# Patient Record
Sex: FEMALE | Race: WHITE | Employment: OTHER | URBAN - METROPOLITAN AREA
[De-identification: names, ages, dates, MRNs, and addresses within clinical notes are randomized per-mention and may not be internally consistent; named-entity substitution may affect disease eponyms.]

---

## 2017-03-15 ENCOUNTER — HOSPITAL ENCOUNTER (OUTPATIENT)
Dept: MAMMOGRAPHY | Age: 62
Discharge: HOME | End: 2017-03-15
Attending: FAMILY MEDICINE
Payer: MEDICARE

## 2017-03-15 ENCOUNTER — APPOINTMENT (OUTPATIENT)
Dept: MAMMOGRAPHY | Age: 62
End: 2017-03-15
Attending: FAMILY MEDICINE
Payer: MEDICARE

## 2017-03-15 DIAGNOSIS — M81.0 OSTEOPOROSIS: ICD-10-CM

## 2017-03-15 DIAGNOSIS — Z12.39 SCREENING FOR MALIGNANT NEOPLASM OF BREAST: ICD-10-CM

## 2017-03-15 PROCEDURE — 77067 SCR MAMMO BI INCL CAD: CPT

## 2017-03-15 PROCEDURE — 77080 DXA BONE DENSITY AXIAL: CPT

## 2018-01-05 PROBLEM — E11.21 TYPE 2 DIABETES MELLITUS WITH NEPHROPATHY (HCC): Status: ACTIVE | Noted: 2018-01-05

## 2018-05-15 PROBLEM — Z87.898 HISTORY OF PROGRESSIVE WEAKNESS: Status: ACTIVE | Noted: 2018-05-15

## 2018-05-15 PROBLEM — M21.371 BILATERAL FOOT-DROP: Status: ACTIVE | Noted: 2018-05-15

## 2018-05-15 PROBLEM — G56.00 CARPAL TUNNEL SYNDROME: Status: ACTIVE | Noted: 2018-05-15

## 2018-05-15 PROBLEM — M21.372 BILATERAL FOOT-DROP: Status: ACTIVE | Noted: 2018-05-15

## 2018-05-23 ENCOUNTER — HOSPITAL ENCOUNTER (OUTPATIENT)
Dept: MAMMOGRAPHY | Age: 63
Discharge: HOME | End: 2018-05-23
Attending: FAMILY MEDICINE
Payer: MEDICARE

## 2018-05-23 DIAGNOSIS — Z12.31 VISIT FOR SCREENING MAMMOGRAM: ICD-10-CM

## 2018-05-23 PROCEDURE — 77067 SCR MAMMO BI INCL CAD: CPT

## 2018-10-22 ENCOUNTER — HOSPITAL ENCOUNTER (OUTPATIENT)
Dept: PHYSICAL THERAPY | Age: 63
Discharge: HOME | End: 2018-10-22
Attending: PODIATRIST
Payer: MEDICARE

## 2018-10-22 DIAGNOSIS — E11.49 TYPE II DIABETES MELLITUS WITH NEUROLOGICAL MANIFESTATIONS NOT AT GOAL (HCC): ICD-10-CM

## 2018-10-22 DIAGNOSIS — R26.81 UNSTEADY GAIT: ICD-10-CM

## 2018-10-22 DIAGNOSIS — E11.21 TYPE 2 DIABETES MELLITUS WITH NEPHROPATHY (HCC): ICD-10-CM

## 2018-10-22 PROCEDURE — 97163 PT EVAL HIGH COMPLEX 45 MIN: CPT

## 2018-10-22 PROCEDURE — G8979 MOBILITY GOAL STATUS: HCPCS

## 2018-10-22 PROCEDURE — G8978 MOBILITY CURRENT STATUS: HCPCS

## 2018-10-22 PROCEDURE — 97110 THERAPEUTIC EXERCISES: CPT

## 2018-10-22 NOTE — THERAPY EVALUATION
Alannah Said  : 1955  Primary: Brad Nolen Medicare Hmo  [place] [facility] [place] [id number] [place] [address], [facility] [address]  [phone]   [phone]        OUTPATIENT PHYSICAL [place] Assessment 10/22/2018   ICD-10: Treatment Diagnosis: Difficulty in walking, not elsewhere classified (R26.2)  Precautions/Allergies:   Bactrim [sulfamethoprim ds]   Fall Risk Score: 6 (? 5 = High Risk)  MD Orders: Evaluate and treat  MEDICAL/REFERRING DIAGNOSIS:  Type II diabetes mellitus with neurological manifestations not at goal [facility]) [E11.49]  Type 2 diabetes mellitus with nephropathy ([facility] [address] [E11.21]  Unsteady gait [R26.81]   DATE OF ONSET: Chronic- over the past several years   REFERRING PHYSICIAN: Claudia Xavier DPM  RETURN PHYSICIAN APPOINTMENT: unknown      INITIAL ASSESSMENT:  Ms. Mayra Huynh presents with decreased lower extremity strength, imbalance, and difficulty walking due to peripheral neuropathy. Patient is at higher fall risk based on history of falls and score received on Roman Balance Scale. Patient would benefit from skilled physical therapy to address problems and goals. Thank you for this referral.      PROBLEM LIST (Impacting functional limitations):  1. Decreased Strength  2. Decreased ADL/Functional Activities  3. Decreased Transfer Abilities  4. Decreased Ambulation Ability/Technique  5. Decreased Balance  6. Decreased Activity Tolerance  7. Increased Fatigue  8. Increased Shortness of Breath  9. Decreased [place] with Home Exercise Program INTERVENTIONS PLANNED:  1. Balance Exercise  2. Gait Training  3. Home Exercise Program (HEP)  4. Neuromuscular Re-education/Strengthening  5. Therapeutic Exercise/Strengthening   TREATMENT PLAN:  Effective Dates: 10/22/2018 TO 2019 (90 days).   Frequency/Duration: 1-2 times a week for 90 Days  GOALS: (Goals have been discussed and agreed upon with patient.)  Short-Term Functional Goals: Time Frame: 30 days   1. Patient will be independent with home exercise program to improve balance. 2. Patient will score less than or equal to 25 seconds on TUG indicating improvement. Discharge Goals: Time Frame: 60 days  1. Patient will score less than or equal to 20 seconds on TUG indicating improvement. 2. Patient will score greater than or equal to 25 on Roman Balance Scale indicating improved balance and decreased fall risk with daily activities. 3. Patient will report improved stability with daily activities. Rehabilitation Potential For Stated Goals: [address] [place] [address] Drive therapy, I certify that the treatment plan above will be carried out by a therapist or under their direction. Thank you for this referral,  Glennon Spatz, PT     Referring Physician Signature: Pattis, Chriss Boas, DPM              Date                    The information in this section was collected on 10/22/2018 (except where otherwise noted). HISTORY:   History of Present Injury/Illness (Reason for Referral):  Patient reports she tends to lose her balance backwards. Patient has had one fall over the past couple of months. Patient has been using a rolling walker since 2012. Patient complains of imbalance, weakness, and difficulty walking. Patient wears bilateral AFOs. Patient rates dizziness as 0/10 and pain level as 0/10.     Past Medical History/Comorbidities:   Ms. Jewel Palacios  has a past medical history of Bladder problem, Cataract, Chronic back pain, Chronic pain, Claudication, intermittent ([address] Closed fracture of lateral malleolus, Dermatophytosis of nail, Diabetes ([address] Dysuria, Hypercholesterolemia, Hypertension, Hypertriglyceridemia, Ingrown toenail, Mixed hyperlipidemia, Neuropathy, Neuropathy associated with endocrine disorder ([address] Neuropathy in diabetes ([address] Osteoporosis, unspecified, Overactive bladder, Paronychia of toe, left, RLS (restless legs syndrome), Screening for colon cancer, Type 2 diabetes mellitus ([facility] [address] Type II or unspecified type diabetes mellitus with neurological manifestations, not stated as uncontrolled(250.60) ([facility] [address] Ulcer of toe, chronic ([facility] [address] and Urge incontinence. Ms. Brunilda Jerez  has a past surgical history that includes hx mohs procedure (Right, 2005); hx orthopaedic (Left, 10/2012); hx colonoscopy (2009); hx open cholecystectomy (1990); hx orthopaedic (6/2013); hx orthopaedic (Left, 12/2012); COLONOSCOPY/ 28 (N/A, 9/17/2014); and ADDUCTOR TENOTOMY (Bilateral, 6/6/2013). Social History/Living Environment:     Lives with nephew and fiancee. Prior Level of Function/Work/Activity:  Independent. Retired. Dominant Side:         RIGHT  Personal Factors:          Sex:  female        Age:  58 y.o. Current Medications:       Current Outpatient Medications:     ciclopirox (CICLODAN) 0.77 % topical cream, Apply  to affected area daily. Apply once daily to affected toenails, Disp: 90 g, Rfl: 2    linaclotide (LINZESS) 145 mcg cap capsule, Take 1 Cap by mouth Daily (before breakfast) for 180 days. , Disp: 90 Cap, Rfl: 1    rosuvastatin (CRESTOR) 20 mg tablet, Take 1 Tab by mouth nightly for 360 days. , Disp: 90 Tab, Rfl: 3    mirabegron ER (MYRBETRIQ) 50 mg ER tablet, Take 1 Tab by mouth daily for 360 days. , Disp: 90 Tab, Rfl: 3    colesevelam (WELCHOL) 625 mg tablet, Take 3 tablets by mouth two times daily, Disp: 540 Tab, Rfl: 3    rOPINIRole (REQUIP) 2 mg tablet, Take 1 tablet by mouth  every night at bedtime, Disp: 90 Tab, Rfl: 3    metFORMIN (GLUCOPHAGE) 1,000 mg tablet, Take 1 tablet by mouth  daily, Disp: 90 Tab, Rfl: 3    lisinopril (PRINIVIL, ZESTRIL) 20 mg tablet, Take 1 Tab by mouth daily.  In the morning  Indications: hypertension, Nondiabetic Proteinuric Nephropathy, Disp: 90 Tab, Rfl: 3    glucose blood VI test strips (ACCU-CHEK RON PLUS TEST STRP) strip, Test twice daily, Disp: 100 Strip, Rfl: 3    Lancets misc, Accu-chek softclix lancets, Disp: 100 Each, Rfl: 3    alendronate (FOSAMAX) 35 mg tablet, Take 1 Tab by mouth every seven (7) days. , Disp: 13 Tab, Rfl: 3    aspirin 81 mg chewable tablet, Take 81 mg by mouth daily. , Disp: , Rfl:    Date Last Reviewed:  10/22/108   Number of Personal Factors/Comorbidities that affect the Plan of Care: 3+: HIGH COMPLEXITY   EXAMINATION:   Observation/Orthostatic Postural Assessment: Forward head/rounded shoulders posture   Functional Mobility:         Gait/Ambulation:  Patient ambulates with rolling walker with decreased gait speed with wide base of support due to imbalance. Strength:          Hip flexion 4-/5, hip abduction 3-/5, hip adduction 4-/5, quadriceps 4-/5, hamstrings 4-/5, ankle dorsiflexion 1/5, and ankle plantarflexion 1/5. Sensation:         Decreased bilateral feet. Postural Control & Balance:  · Roman Balance Scale:  16/56.   (A score less than 45/56 indicates high risk of falls)     · Dynamic Gait Index:  Not tested. Body Structures Involved:  1. Nerves  2. Muscles Body Functions Affected:  1. Neuromusculoskeletal Activities and Participation Affected:  1. General Tasks and Demands  2. Mobility  3. Interpersonal Interactions and Relationships  4. Community, Social and [place]   Number of elements (examined above) that affect the Plan of Care: 4+: HIGH COMPLEXITY   CLINICAL PRESENTATION:   Presentation: Evolving clinical presentation with unstable and unpredictable characteristics: HIGH COMPLEXITY   CLINICAL DECISION MAKING:   Outcome Measure: Tool Used: Timed Up and Go (TUG)  Score:  Initial: 33.45 seconds Most Recent: X seconds (Date: -- )   Interpretation of Score: The test measures, in seconds, the time taken by an individual to stand up from a standard arm chair (seat height 46 cm [18 in], arm height 65 cm [25.6 in]), walk a distance of 3 meters (118 in, approx 10 ft), turn, walk back to the chair and sit down.   If the individual takes longer than 14 seconds to complete TUG, this indicates risk for falls. Score 7 7.5-10.5 11-14 14.5-17.5 18-21 21.5-24.5 25+   Modifier CH CI CJ CK CL CM CN     ? Mobility - Walking and Moving Around:     - CURRENT STATUS: CN - 100% impaired, limited or restricted    - GOAL STATUS: CL - 60%-79% impaired, limited or restricted    - D/C STATUS:  ---------------To be determined---------------    Medical Necessity:   · Patient is expected to demonstrate progress in strength and balance to improve safety during activities of daily living. Reason for Services/Other Comments:  · Patient continues to require skilled intervention due to higher fall risk with daily activities. Use of outcome tool(s) and clinical judgement create a POC that gives a: Difficult prediction of patient's progress: HIGH COMPLEXITY            TREATMENT:   (In addition to Assessment/Re-Assessment sessions the following treatments were rendered)  Pre-treatment Symptoms/Complaints:  Imbalance, weakness, and difficulty walking  Pain: Initial:   Pain Intensity 1: 0  Post Session:  0     Initial Evaluation: 30 minutes  THERAPEUTIC EXERCISE: (15 minutes):  Exercises per grid below to improve strength. Required minimal verbal cues to promote proper body alignment. Progressed repetitions as indicated. Date:  10/22/2018 Date:   Date:     Activity/Exercise Parameters Parameters Parameters   Standing hip flexion 10 reps B     Standing hip abduction 10 reps B     Standing hamstring curls 10 reps B     Standing hip extension 10 reps B                         [employer]  Treatment/Session Assessment:    · Response to Treatment:  Tolerated without issues. · Compliance with Program/Exercises: Will assess as treatment progresses. · Recommendations/Intent for next treatment session: \"Next visit will focus on advancements to more challenging activities\".   Total Treatment Duration:  PT Patient Time In/Time Out  Time In: 1345  Time Out: [address] [facility]    Future Appointments   Date Time Provider Alok Cox   11/1/2018  1:45 PM Bashir Bennett, PT [facility]FE   11/7/2018  3:00 PM Berny Romero DO [facility] B[facility]   11/9/2018  3:15 PM Vissage, Gladys Jeffrey, PT [facility]   11/16/2018 11:15 AM Vissage Gladys Jeffrey, PT [facility]FE   11/30/2018  1:00 PM Bashir Bennett, PT [facility] SFE   1/2/2019 10:40 AM Dea Xavier, DPM [address]   1/7/2019  9:15 AM [facility]

## 2018-10-23 NOTE — PROGRESS NOTES
Ambulatory/Rehab Services H2 Model Falls Risk Assessment    Risk Factor Pts. ·   Confusion/Disorientation/Impulsivity  []    4 ·   Symptomatic Depression  []   2 ·   Altered Elimination  []   1 ·   Dizziness/Vertigo  []   1 ·   Gender (Male)  []   1 ·   Any administered antiepileptics (anticonvulsants):  []   2 ·   Any administered benzodiazepines:  []   1 ·   Visual Impairment (specify):  []   1 ·   Portable Oxygen Use  []   1 ·   Orthostatic ? BP  []   1 ·   History of Recent Falls (within 3 mos.)  [x]   5     Ability to Rise from Chair (choose one) Pts. ·   Ability to rise in a single movement  []   0 ·   Pushes up, successful in one attempt  [x]   1 ·   Multiple attempts, but successful  []   3 ·   Unable to rise without assistance  []   4   Total: (5 or greater = High Risk) 6     Falls Prevention Plan:   []                Physical Limitations to Exercise (specify):   []                Mobility Assistance Device (type):   [x]                Exercise/Equipment Adaptation (specify): Patient will be supervised in the clinic at all times due to higher fall risk. ©2010 [facility] [employer] . [facility] Patent #[id number].  Federal Law prohibits the replication, distribution or use without written permission from [facility] [employer]

## 2018-11-01 ENCOUNTER — HOSPITAL ENCOUNTER (OUTPATIENT)
Dept: PHYSICAL THERAPY | Age: 63
Discharge: HOME | End: 2018-11-01
Payer: MEDICARE

## 2018-11-01 PROCEDURE — 97110 THERAPEUTIC EXERCISES: CPT

## 2018-11-01 NOTE — PROGRESS NOTES
Neris Figueroa  : 1955  Primary: Cynthia Nolen Medicare Hmo  [place] [facility] [place] [address], [facility] [address]  [phone]   [phone]        OUTPATIENT PHYSICAL [address] Note 2018   ICD-10: Treatment Diagnosis: Difficulty in walking, not elsewhere classified (R26.2)  Precautions/Allergies:   Bactrim [sulfamethoprim ds]   Fall Risk Score: 6 (? 5 = High Risk)  MD Orders: Evaluate and treat  MEDICAL/REFERRING DIAGNOSIS:  Type 2 diabetes mellitus with other diabetic neurological complication [N97.41]   DATE OF ONSET: Chronic- over the past several years   REFERRING PHYSICIAN: Ana Xavier DPM  RETURN PHYSICIAN APPOINTMENT: unknown      INITIAL ASSESSMENT:  Ms. Danielle Samuel presents with decreased lower extremity strength, imbalance, and difficulty walking due to peripheral neuropathy. Patient is at higher fall risk based on history of falls and score received on Roman Balance Scale. Patient would benefit from skilled physical therapy to address problems and goals. Thank you for this referral.      PROBLEM LIST (Impacting functional limitations):  1. Decreased Strength  2. Decreased ADL/Functional Activities  3. Decreased Transfer Abilities  4. Decreased Ambulation Ability/Technique  5. Decreased Balance  6. Decreased Activity Tolerance  7. Increased Fatigue  8. Increased Shortness of Breath  9. Decreased [place] with Home Exercise Program INTERVENTIONS PLANNED:  1. Balance Exercise  2. Gait Training  3. Home Exercise Program (HEP)  4. Neuromuscular Re-education/Strengthening  5. Therapeutic Exercise/Strengthening   TREATMENT PLAN:  Effective Dates: 10/22/2018 TO 2019 (90 days). Frequency/Duration: 1-2 times a week for 90 Days  GOALS: (Goals have been discussed and agreed upon with patient.)  Short-Term Functional Goals: Time Frame: 30 days   1.  Patient will be independent with home exercise program to improve balance. 2. Patient will score less than or equal to 25 seconds on TUG indicating improvement. Discharge Goals: Time Frame: 60 days  1. Patient will score less than or equal to 20 seconds on TUG indicating improvement. 2. Patient will score greater than or equal to 25 on Roman Balance Scale indicating improved balance and decreased fall risk with daily activities. 3. Patient will report improved stability with daily activities. Rehabilitation Potential For Stated Goals: [address] [place] [address] Drive therapy, I certify that the treatment plan above will be carried out by a therapist or under their direction. Thank you for this referral,  Mimi De Paz PT     Referring Physician Signature: Trung Xavier DPM              Date                    The information in this section was collected on 10/22/2018 (except where otherwise noted). HISTORY:   History of Present Injury/Illness (Reason for Referral):  Patient reports she tends to lose her balance backwards. Patient has had one fall over the past couple of months. Patient has been using a rolling walker since 2012. Patient complains of imbalance, weakness, and difficulty walking. Patient wears bilateral AFOs. Patient rates dizziness as 0/10 and pain level as 0/10.     Past Medical History/Comorbidities:   Ms. Casper Addison  has a past medical history of Bladder problem, Cataract, Chronic back pain, Chronic pain, Claudication, intermittent ([address] Closed fracture of lateral malleolus, Dermatophytosis of nail, Diabetes ([address] Dysuria, Hypercholesterolemia, Hypertension, Hypertriglyceridemia, Ingrown toenail, Mixed hyperlipidemia, Neuropathy, Neuropathy associated with endocrine disorder ([address] Neuropathy in diabetes ([address] Osteoporosis, unspecified, Overactive bladder, Paronychia of toe, left, RLS (restless legs syndrome), Screening for colon cancer, Type 2 diabetes mellitus ([address] Type II or unspecified type diabetes mellitus with neurological manifestations, not stated as uncontrolled(250.60) ([address] Ulcer of toe, chronic ([address] and Urge incontinence. Ms. Chirag Dahl  has a past surgical history that includes hx mohs procedure (Right, 2005); hx orthopaedic (Left, 10/2012); hx colonoscopy (2009); hx open cholecystectomy (1990); hx orthopaedic (6/2013); hx orthopaedic (Left, 12/2012); COLONOSCOPY/ 28 (N/A, 9/17/2014); and ADDUCTOR TENOTOMY (Bilateral, 6/6/2013). Social History/Living Environment:     Lives with nephew and fiancee. Prior Level of Function/Work/Activity:  Independent. Retired. Dominant Side:         RIGHT  Personal Factors:          Sex:  female        Age:  58 y.o. Current Medications:       Current Outpatient Medications:     ciclopirox (CICLODAN) 0.77 % topical cream, Apply  to affected area daily. Apply once daily to affected toenails, Disp: 90 g, Rfl: 2    linaclotide (LINZESS) 145 mcg cap capsule, Take 1 Cap by mouth Daily (before breakfast) for 180 days. , Disp: 90 Cap, Rfl: 1    rosuvastatin (CRESTOR) 20 mg tablet, Take 1 Tab by mouth nightly for 360 days. , Disp: 90 Tab, Rfl: 3    mirabegron ER (MYRBETRIQ) 50 mg ER tablet, Take 1 Tab by mouth daily for 360 days. , Disp: 90 Tab, Rfl: 3    colesevelam (WELCHOL) 625 mg tablet, Take 3 tablets by mouth two times daily, Disp: 540 Tab, Rfl: 3    rOPINIRole (REQUIP) 2 mg tablet, Take 1 tablet by mouth  every night at bedtime, Disp: 90 Tab, Rfl: 3    metFORMIN (GLUCOPHAGE) 1,000 mg tablet, Take 1 tablet by mouth  daily, Disp: 90 Tab, Rfl: 3    lisinopril (PRINIVIL, ZESTRIL) 20 mg tablet, Take 1 Tab by mouth daily.  In the morning  Indications: hypertension, Nondiabetic Proteinuric Nephropathy, Disp: 90 Tab, Rfl: 3    glucose blood VI test strips (ACCU-CHEK RON PLUS TEST STRP) strip, Test twice daily, Disp: 100 Strip, Rfl: 3    Lancets misc, Accu-chek softclix lancets, Disp: 100 Each, Rfl: 3    alendronate (FOSAMAX) 35 mg tablet, Take 1 Tab by mouth every seven (7) days. , Disp: 13 Tab, Rfl: 3    aspirin 81 mg chewable tablet, Take 81 mg by mouth daily. , Disp: , Rfl:    Date Last Reviewed:  11/1/18   Number of Personal Factors/Comorbidities that affect the Plan of Care: 3+: HIGH COMPLEXITY   EXAMINATION:   Observation/Orthostatic Postural Assessment: Forward head/rounded shoulders posture   Functional Mobility:         Gait/Ambulation:  Patient ambulates with rolling walker with decreased gait speed with wide base of support due to imbalance. Strength:          Hip flexion 4-/5, hip abduction 3-/5, hip adduction 4-/5, quadriceps 4-/5, hamstrings 4-/5, ankle dorsiflexion 1/5, and ankle plantarflexion 1/5. Sensation:         Decreased bilateral feet. Postural Control & Balance:  · Roman Balance Scale:  16/56.   (A score less than 45/56 indicates high risk of falls)     · Dynamic Gait Index:  Not tested. Body Structures Involved:  1. Nerves  2. Muscles Body Functions Affected:  1. Neuromusculoskeletal Activities and Participation Affected:  1. General Tasks and Demands  2. Mobility  3. Interpersonal Interactions and Relationships  4. Community, Social and [place]   Number of elements (examined above) that affect the Plan of Care: 4+: HIGH COMPLEXITY   CLINICAL PRESENTATION:   Presentation: Evolving clinical presentation with unstable and unpredictable characteristics: HIGH COMPLEXITY   CLINICAL DECISION MAKING:   Outcome Measure: Tool Used: Timed Up and Go (TUG)  Score:  Initial: 33.45 seconds Most Recent: X seconds (Date: -- )   Interpretation of Score: The test measures, in seconds, the time taken by an individual to stand up from a standard arm chair (seat height 46 cm [18 in], arm height 65 cm [25.6 in]), walk a distance of 3 meters (118 in, approx 10 ft), turn, walk back to the chair and sit down. If the individual takes longer than 14 seconds to complete TUG, this indicates risk for falls.   Score 7 7.5-10.5 11-14 14.5-17.5 18-21 21.5-24.5 25+   Modifier CH CI CJ CK CL CM CN     ? Mobility - Walking and Moving Around:     - CURRENT STATUS: CN - 100% impaired, limited or restricted    - GOAL STATUS: CL - 60%-79% impaired, limited or restricted    - D/C STATUS:  ---------------To be determined---------------    Medical Necessity:   · Patient is expected to demonstrate progress in strength and balance to improve safety during activities of daily living. Reason for Services/Other Comments:  · Patient continues to require skilled intervention due to higher fall risk with daily activities. Use of outcome tool(s) and clinical judgement create a POC that gives a: Difficult prediction of patient's progress: HIGH COMPLEXITY            TREATMENT:   (In addition to Assessment/Re-Assessment sessions the following treatments were rendered)  Pre-treatment Symptoms/Complaints:  \"Doing okay\". Patient apologized for being late. \"We got lost coming from [facility]". Pain: Initial:   Pain Intensity 1: 0  Post Session:  0     THERAPEUTIC EXERCISE: (25 minutes):  Exercises per grid below to improve strength. Required minimal verbal cues to promote proper body alignment. Progressed repetitions as indicated. Date:  10/22/2018 Date:  11/1/18 Date:   Date:     Activity/Exercise Parameters  Parameters Parameters   Standing hip flexion 10 reps B HEP     Standing hip abduction 10 reps B HEP     Standing hamstring curls 10 reps B HEP     Standing hip extension 10 reps B HEP     Step ups   X 10 reps     Step over half foam  2x10 reps     Sit to stand from hilo mat  2x10 reps     Nustep  Level 3 x 5 minutes                            [employer] Portal  Treatment/Session Assessment:    · Response to Treatment:  Patient reports fatigue at the end of treatment. · Compliance with Program/Exercises: Will assess as treatment progresses. · Recommendations/Intent for next treatment session:  \"Next visit will focus on advancements to more challenging activities\".   Total Treatment Duration:  PT Patient Time In/Time Out  Time In: 1405  Time Out: [address] [facility] [address], PT    Future Appointments   Date Time Provider Alok Cox   11/7/2018  3:00 PM Marco Estrada DO [facility]   11/9/2018  3:15 PM Javier Lombardo, PT [facility]   11/16/2018 11:15 AM Javier Lombardo, PT [facility]   11/30/2018  1:00 PM Javier Lombardo, PT [facility]   1/2/2019 10:40 AM Aram Xavier, DPM [address]   1/7/2019  9:15 AM Yaya Fuentes MD [facility]

## 2018-11-09 ENCOUNTER — HOSPITAL ENCOUNTER (OUTPATIENT)
Dept: PHYSICAL THERAPY | Age: 63
Discharge: HOME | End: 2018-11-09
Payer: MEDICARE

## 2018-11-09 PROCEDURE — 97110 THERAPEUTIC EXERCISES: CPT

## 2018-11-09 NOTE — PROGRESS NOTES
Earnest Edmondson  : 1955  Primary: [employer] Hmo  [place]: [place] [facility] at [address] [id number] [facility], [address] [place] [address],[address], [address] [facility]  [phone]   [phone]        OUTPATIENT PHYSICAL [address] Note 2018   ICD-10: Treatment Diagnosis: Difficulty in walking, not elsewhere classified (R26.2)  Precautions/Allergies:   Bactrim [sulfamethoprim ds]   Fall Risk Score: 6 (? 5 = High Risk)  MD Orders: Evaluate and treat  MEDICAL/REFERRING DIAGNOSIS:  Type 2 diabetes mellitus with other diabetic neurological complication [Q16.23]   DATE OF ONSET: Chronic- over the past several years   REFERRING PHYSICIAN: Dea Xavier DPM  RETURN PHYSICIAN APPOINTMENT: unknown      INITIAL ASSESSMENT:  Ms. Salvatore Lyon presents with decreased lower extremity strength, imbalance, and difficulty walking due to peripheral neuropathy. Patient is at higher fall risk based on history of falls and score received on Roman Balance Scale. Patient would benefit from skilled physical therapy to address problems and goals. Thank you for this referral.      PROBLEM LIST (Impacting functional limitations):  1. Decreased Strength  2. Decreased ADL/Functional Activities  3. Decreased Transfer Abilities  4. Decreased Ambulation Ability/Technique  5. Decreased Balance  6. Decreased Activity Tolerance  7. Increased Fatigue  8. Increased Shortness of Breath  9. Decreased [place] with Home Exercise Program INTERVENTIONS PLANNED:  1. Balance Exercise  2. Gait Training  3. Home Exercise Program (HEP)  4. Neuromuscular Re-education/Strengthening  5. Therapeutic Exercise/Strengthening   TREATMENT PLAN:  Effective Dates: 10/22/2018 TO 2019 (90 days). Frequency/Duration: 1-2 times a week for 90 Days  GOALS: (Goals have been discussed and agreed upon with patient.)  Short-Term Functional Goals: Time Frame: 30 days   1.  Patient will be independent with home exercise program to improve balance. 2. Patient will score less than or equal to 25 seconds on TUG indicating improvement. Discharge Goals: Time Frame: 60 days  1. Patient will score less than or equal to 20 seconds on TUG indicating improvement. 2. Patient will score greater than or equal to 25 on Roman Balance Scale indicating improved balance and decreased fall risk with daily activities. 3. Patient will report improved stability with daily activities. Rehabilitation Potential For Stated Goals: Fair            The information in this section was collected on 10/22/2018 (except where otherwise noted). HISTORY:   History of Present Injury/Illness (Reason for Referral):  Patient reports she tends to lose her balance backwards. Patient has had one fall over the past couple of months. Patient has been using a rolling walker since 2012. Patient complains of imbalance, weakness, and difficulty walking. Patient wears bilateral AFOs. Patient rates dizziness as 0/10 and pain level as 0/10. Past Medical History/Comorbidities:   Ms. Mayra Huynh  has a past medical history of Bladder problem, Cataract, Chronic back pain, Chronic pain, Claudication, intermittent ([address] Closed fracture of lateral malleolus, Dermatophytosis of nail, Diabetes ([address] Dysuria, Hypercholesterolemia, Hypertension, Hypertriglyceridemia, Ingrown toenail, Mixed hyperlipidemia, Neuropathy, Neuropathy associated with endocrine disorder ([address] Neuropathy in diabetes ([address] Osteoporosis, unspecified, Overactive bladder, Paronychia of toe, left, RLS (restless legs syndrome), Screening for colon cancer, Type 2 diabetes mellitus ([address] Type II or unspecified type diabetes mellitus with neurological manifestations, not stated as uncontrolled(250.60) ([address] Ulcer of toe, chronic ([address] and Urge incontinence.   Ms. Mayra Huynh  has a past surgical history that includes hx mohs procedure (Right, 2005); hx orthopaedic (Left, 10/2012); hx colonoscopy (2009); hx open cholecystectomy (1990); hx orthopaedic (6/2013); hx orthopaedic (Left, 12/2012); COLONOSCOPY/ 28 (N/A, 9/17/2014); and ADDUCTOR TENOTOMY (Bilateral, 6/6/2013). Social History/Living Environment:     Lives with nephew and fiancee. Prior Level of Function/Work/Activity:  Independent. Retired. Dominant Side:         RIGHT  Personal Factors:          Sex:  female        Age:  58 y.o. Current Medications:       Current Outpatient Medications:     ciclopirox (CICLODAN) 0.77 % topical cream, Apply  to affected area daily. Apply once daily to affected toenails, Disp: 90 g, Rfl: 2    linaclotide (LINZESS) 145 mcg cap capsule, Take 1 Cap by mouth Daily (before breakfast) for 180 days. , Disp: 90 Cap, Rfl: 1    rosuvastatin (CRESTOR) 20 mg tablet, Take 1 Tab by mouth nightly for 360 days. , Disp: 90 Tab, Rfl: 3    mirabegron ER (MYRBETRIQ) 50 mg ER tablet, Take 1 Tab by mouth daily for 360 days. , Disp: 90 Tab, Rfl: 3    colesevelam (WELCHOL) 625 mg tablet, Take 3 tablets by mouth two times daily, Disp: 540 Tab, Rfl: 3    rOPINIRole (REQUIP) 2 mg tablet, Take 1 tablet by mouth  every night at bedtime, Disp: 90 Tab, Rfl: 3    metFORMIN (GLUCOPHAGE) 1,000 mg tablet, Take 1 tablet by mouth  daily, Disp: 90 Tab, Rfl: 3    lisinopril (PRINIVIL, ZESTRIL) 20 mg tablet, Take 1 Tab by mouth daily. In the morning  Indications: hypertension, Nondiabetic Proteinuric Nephropathy, Disp: 90 Tab, Rfl: 3    glucose blood VI test strips (ACCU-CHEK RON PLUS TEST STRP) strip, Test twice daily, Disp: 100 Strip, Rfl: 3    Lancets misc, Accu-chek softclix lancets, Disp: 100 Each, Rfl: 3    alendronate (FOSAMAX) 35 mg tablet, Take 1 Tab by mouth every seven (7) days. , Disp: 13 Tab, Rfl: 3    aspirin 81 mg chewable tablet, Take 81 mg by mouth daily. , Disp: , Rfl:    Date Last Reviewed:  11/9/18   Number of Personal Factors/Comorbidities that affect the Plan of Care: 3+: HIGH COMPLEXITY   EXAMINATION: Observation/Orthostatic Postural Assessment: Forward head/rounded shoulders posture   Functional Mobility:         Gait/Ambulation:  Patient ambulates with rolling walker with decreased gait speed with wide base of support due to imbalance. Strength:          Hip flexion 4-/5, hip abduction 3-/5, hip adduction 4-/5, quadriceps 4-/5, hamstrings 4-/5, ankle dorsiflexion 1/5, and ankle plantarflexion 1/5. Sensation:         Decreased bilateral feet. Postural Control & Balance:  · Roman Balance Scale:  16/56.   (A score less than 45/56 indicates high risk of falls)     · Dynamic Gait Index:  Not tested. Body Structures Involved:  1. Nerves  2. Muscles Body Functions Affected:  1. Neuromusculoskeletal Activities and Participation Affected:  1. General Tasks and Demands  2. Mobility  3. Interpersonal Interactions and Relationships  4. Community, Social and [place]   Number of elements (examined above) that affect the Plan of Care: 4+: HIGH COMPLEXITY   CLINICAL PRESENTATION:   Presentation: Evolving clinical presentation with unstable and unpredictable characteristics: HIGH COMPLEXITY   CLINICAL DECISION MAKING:   Outcome Measure: Tool Used: Timed Up and Go (TUG)  Score:  Initial: 33.45 seconds Most Recent: X seconds (Date: -- )   Interpretation of Score: The test measures, in seconds, the time taken by an individual to stand up from a standard arm chair (seat height 46 cm [18 in], arm height 65 cm [25.6 in]), walk a distance of 3 meters (118 in, approx 10 ft), turn, walk back to the chair and sit down. If the individual takes longer than 14 seconds to complete TUG, this indicates risk for falls. Score 7 7.5-10.5 11-14 14.5-17.5 18-21 21.5-24.5 25+   Modifier CH CI CJ CK CL CM CN     ?  Mobility - Walking and Moving Around:     - CURRENT STATUS: CN - 100% impaired, limited or restricted    - GOAL STATUS: CL - 60%-79% impaired, limited or restricted    - D/C STATUS:  ---------------To be determined---------------    Medical Necessity:   · Patient is expected to demonstrate progress in strength and balance to improve safety during activities of daily living. Reason for Services/Other Comments:  · Patient continues to require skilled intervention due to higher fall risk with daily activities. Use of outcome tool(s) and clinical judgement create a POC that gives a: Difficult prediction of patient's progress: HIGH COMPLEXITY            TREATMENT:   (In addition to Assessment/Re-Assessment sessions the following treatments were rendered)  Pre-treatment Symptoms/Complaints:  Patient reports she was sore after last treatment, but she felt better the next day. Pain: Initial:   Pain Intensity 1: 0  Post Session:  0     THERAPEUTIC EXERCISE: (45 minutes):  Exercises per grid below to improve strength. Required minimal verbal cues to promote proper body alignment. Progressed repetitions as indicated. Date:  10/22/2018 Date:  11/1/18 Date:  11/9/18 Date:     Activity/Exercise Parameters  Parameters Parameters   Standing hip flexion 10 reps B HEP X 10 reps B 1#    Standing hip abduction 10 reps B HEP x10 reps B 1#    Standing hamstring curls 10 reps B HEP x10 reps B 1#    Standing hip extension 10 reps B HEP X    Step ups   X 10 reps 6 inch  2x10    Step over half foam  2x10 reps 2x10 rep    Sit to stand from hilo mat  2x10 reps 2x10 reps    Nustep  Level 3 x 5 minutes Level 3 x 8 minutes    Step taps   6 inch 2x10B    Seated knee extension   X 10 reps B 1#             [employer] Portal  Treatment/Session Assessment:    · Response to Treatment:  Patient tolerated additional exercises without complaints. · Compliance with Program/Exercises: Compliant. · Recommendations/Intent for next treatment session: \"Next visit will focus on advancements to more challenging activities\".   Total Treatment Duration:  PT Patient Time In/Time Out  Time In: 9505  Time Out: [address] ASHLY Lombardo    Future Appointments   Date Time Provider Alok Cox   11/16/2018 11:15 AM Alyse Haque, PT [facility]   11/30/2018  1:00 PM Alyse Haque, PT [facility]   12/5/2018  2:30 PM Francisca Calero DO [facility]   1/2/2019 10:40 AM Radha Xavier, DPM [address]   1/7/2019  9:15 AM James Aleman MD [facility]

## 2018-11-16 ENCOUNTER — HOSPITAL ENCOUNTER (OUTPATIENT)
Dept: PHYSICAL THERAPY | Age: 63
Discharge: HOME | End: 2018-11-16
Payer: MEDICARE

## 2018-11-16 PROCEDURE — 97110 THERAPEUTIC EXERCISES: CPT

## 2018-11-16 NOTE — PROGRESS NOTES
Neris Figueroa  : 1955  Primary: Cynthia Nolen Medicare Hmo  [place]: [place] Of [facility] at [address] [place] [id number] [facility], [address] [place] [address],[address], [facility] [address]  [phone]   [phone]        OUTPATIENT PHYSICAL [address] Note 2018   ICD-10: Treatment Diagnosis: Difficulty in walking, not elsewhere classified (R26.2)  Precautions/Allergies:   Bactrim [sulfamethoprim ds]   Fall Risk Score: 6 (? 5 = High Risk)  MD Orders: Evaluate and treat  MEDICAL/REFERRING DIAGNOSIS:  Type 2 diabetes mellitus with other diabetic neurological complication [P67.25]   DATE OF ONSET: Chronic- over the past several years   REFERRING PHYSICIAN: Ana Xavier DPM  RETURN PHYSICIAN APPOINTMENT: unknown      INITIAL ASSESSMENT:  Ms. Danielle Samuel presents with decreased lower extremity strength, imbalance, and difficulty walking due to peripheral neuropathy. Patient is at higher fall risk based on history of falls and score received on Roman Balance Scale. Patient would benefit from skilled physical therapy to address problems and goals. Thank you for this referral.      PROBLEM LIST (Impacting functional limitations):  1. Decreased Strength  2. Decreased ADL/Functional Activities  3. Decreased Transfer Abilities  4. Decreased Ambulation Ability/Technique  5. Decreased Balance  6. Decreased Activity Tolerance  7. Increased Fatigue  8. Increased Shortness of Breath  9. Decreased [place] with Home Exercise Program INTERVENTIONS PLANNED:  1. Balance Exercise  2. Gait Training  3. Home Exercise Program (HEP)  4. Neuromuscular Re-education/Strengthening  5. Therapeutic Exercise/Strengthening   TREATMENT PLAN:  Effective Dates: 10/22/2018 TO 2019 (90 days). Frequency/Duration: 1-2 times a week for 90 Days  GOALS: (Goals have been discussed and agreed upon with patient.)  Short-Term Functional Goals: Time Frame: 30 days   1.  Patient will be independent with home exercise program to improve balance. 2. Patient will score less than or equal to 25 seconds on TUG indicating improvement. Discharge Goals: Time Frame: 60 days  1. Patient will score less than or equal to 20 seconds on TUG indicating improvement. 2. Patient will score greater than or equal to 25 on Roman Balance Scale indicating improved balance and decreased fall risk with daily activities. 3. Patient will report improved stability with daily activities. Rehabilitation Potential For Stated Goals: Fair            The information in this section was collected on 10/22/2018 (except where otherwise noted). HISTORY:   History of Present Injury/Illness (Reason for Referral):  Patient reports she tends to lose her balance backwards. Patient has had one fall over the past couple of months. Patient has been using a rolling walker since 2012. Patient complains of imbalance, weakness, and difficulty walking. Patient wears bilateral AFOs. Patient rates dizziness as 0/10 and pain level as 0/10. Past Medical History/Comorbidities:   Ms. Blas Moreland  has a past medical history of Bladder problem, Cataract, Chronic back pain, Chronic pain, Claudication, intermittent ([address] Closed fracture of lateral malleolus, Dermatophytosis of nail, Diabetes ([address] Dysuria, Hypercholesterolemia, Hypertension, Hypertriglyceridemia, Ingrown toenail, Mixed hyperlipidemia, Neuropathy, Neuropathy associated with endocrine disorder ([address] Neuropathy in diabetes ([address] Osteoporosis, unspecified, Overactive bladder, Paronychia of toe, left, RLS (restless legs syndrome), Screening for colon cancer, Type 2 diabetes mellitus ([address] Type II or unspecified type diabetes mellitus with neurological manifestations, not stated as uncontrolled(250.60) ([address] Ulcer of toe, chronic ([address] and Urge incontinence.   Ms. Blas Moreland  has a past surgical history that includes hx mohs procedure (Right, 2005); hx orthopaedic (Left, 10/2012); hx colonoscopy (2009); hx open cholecystectomy (1990); hx orthopaedic (6/2013); hx orthopaedic (Left, 12/2012); COLONOSCOPY/ 28 (N/A, 9/17/2014); and ADDUCTOR TENOTOMY (Bilateral, 6/6/2013). Social History/Living Environment:     Lives with nephew and fiancee. Prior Level of Function/Work/Activity:  Independent. Retired. Dominant Side:         RIGHT  Personal Factors:          Sex:  female        Age:  58 y.o. Current Medications:       Current Outpatient Medications:     ciclopirox (CICLODAN) 0.77 % topical cream, Apply  to affected area daily. Apply once daily to affected toenails, Disp: 90 g, Rfl: 2    linaclotide (LINZESS) 145 mcg cap capsule, Take 1 Cap by mouth Daily (before breakfast) for 180 days. , Disp: 90 Cap, Rfl: 1    rosuvastatin (CRESTOR) 20 mg tablet, Take 1 Tab by mouth nightly for 360 days. , Disp: 90 Tab, Rfl: 3    mirabegron ER (MYRBETRIQ) 50 mg ER tablet, Take 1 Tab by mouth daily for 360 days. , Disp: 90 Tab, Rfl: 3    colesevelam (WELCHOL) 625 mg tablet, Take 3 tablets by mouth two times daily, Disp: 540 Tab, Rfl: 3    rOPINIRole (REQUIP) 2 mg tablet, Take 1 tablet by mouth  every night at bedtime, Disp: 90 Tab, Rfl: 3    metFORMIN (GLUCOPHAGE) 1,000 mg tablet, Take 1 tablet by mouth  daily, Disp: 90 Tab, Rfl: 3    lisinopril (PRINIVIL, ZESTRIL) 20 mg tablet, Take 1 Tab by mouth daily. In the morning  Indications: hypertension, Nondiabetic Proteinuric Nephropathy, Disp: 90 Tab, Rfl: 3    glucose blood VI test strips (ACCU-CHEK RON PLUS TEST STRP) strip, Test twice daily, Disp: 100 Strip, Rfl: 3    Lancets misc, Accu-chek softclix lancets, Disp: 100 Each, Rfl: 3    alendronate (FOSAMAX) 35 mg tablet, Take 1 Tab by mouth every seven (7) days. , Disp: 13 Tab, Rfl: 3    aspirin 81 mg chewable tablet, Take 81 mg by mouth daily. , Disp: , Rfl:    Date Last Reviewed:  11/16/18   Number of Personal Factors/Comorbidities that affect the Plan of Care: 3+: HIGH COMPLEXITY   EXAMINATION: Observation/Orthostatic Postural Assessment: Forward head/rounded shoulders posture   Functional Mobility:         Gait/Ambulation:  Patient ambulates with rolling walker with decreased gait speed with wide base of support due to imbalance. Strength:          Hip flexion 4-/5, hip abduction 3-/5, hip adduction 4-/5, quadriceps 4-/5, hamstrings 4-/5, ankle dorsiflexion 1/5, and ankle plantarflexion 1/5. Sensation:         Decreased bilateral feet. Postural Control & Balance:  · Roman Balance Scale:  16/56.   (A score less than 45/56 indicates high risk of falls)     · Dynamic Gait Index:  Not tested. Body Structures Involved:  1. Nerves  2. Muscles Body Functions Affected:  1. Neuromusculoskeletal Activities and Participation Affected:  1. General Tasks and Demands  2. Mobility  3. Interpersonal Interactions and Relationships  4. Community, Social and [place]   Number of elements (examined above) that affect the Plan of Care: 4+: HIGH COMPLEXITY   CLINICAL PRESENTATION:   Presentation: Evolving clinical presentation with unstable and unpredictable characteristics: HIGH COMPLEXITY   CLINICAL DECISION MAKING:   Outcome Measure: Tool Used: Timed Up and Go (TUG)  Score:  Initial: 33.45 seconds Most Recent: X seconds (Date: -- )   Interpretation of Score: The test measures, in seconds, the time taken by an individual to stand up from a standard arm chair (seat height 46 cm [18 in], arm height 65 cm [25.6 in]), walk a distance of 3 meters (118 in, approx 10 ft), turn, walk back to the chair and sit down. If the individual takes longer than 14 seconds to complete TUG, this indicates risk for falls. Score 7 7.5-10.5 11-14 14.5-17.5 18-21 21.5-24.5 25+   Modifier CH CI CJ CK CL CM CN     ?  Mobility - Walking and Moving Around:     - CURRENT STATUS: CN - 100% impaired, limited or restricted    - GOAL STATUS: CL - 60%-79% impaired, limited or restricted    - D/C STATUS:  ---------------To be determined---------------    Medical Necessity:   · Patient is expected to demonstrate progress in strength and balance to improve safety during activities of daily living. Reason for Services/Other Comments:  · Patient continues to require skilled intervention due to higher fall risk with daily activities. Use of outcome tool(s) and clinical judgement create a POC that gives a: Difficult prediction of patient's progress: HIGH COMPLEXITY            TREATMENT:   (In addition to Assessment/Re-Assessment sessions the following treatments were rendered)  Pre-treatment Symptoms/Complaints:  Patient reports she is doing okay. Pain: Initial:   Pain Intensity 1: 0  Post Session:  0     THERAPEUTIC EXERCISE: (45 minutes):  Exercises per grid below to improve strength. Required minimal verbal cues to promote proper body alignment. Progressed repetitions as indicated. Date:  10/22/2018 Date:  11/1/18 Date:  11/9/18 Date:  11/16/18   Activity/Exercise Parameters  Parameters Parameters   Standing hip flexion 10 reps B HEP X 10 reps B 1# X 15 reps B 1#   Standing hip abduction 10 reps B HEP x10 reps B 1# X 15 reps B 1#   Standing hamstring curls 10 reps B HEP x10 reps B 1# X 15 reps B 1#   Standing hip extension 10 reps B HEP X X   Step ups   X 10 reps 6 inch  2x10 6 inch  2x10   Step over half foam  2x10 reps 2x10 rep 2x10 reps   Sit to stand from hilo mat  2x10 reps 2x10 reps 2x10 reps   Nustep  Level 3 x 5 minutes Level 3 x 8 minutes Level 3 x 8 minutes   Step taps   6 inch 2x10B 6 inch 2x10B   Seated knee extension   X 10 reps B 1# X 15 reps B 1#            [employer] Portal  Treatment/Session Assessment:    · Response to Treatment:  Patient tolerated exercises well. · Compliance with Program/Exercises: Compliant. · Recommendations/Intent for next treatment session: \"Next visit will focus on advancements to more challenging activities\".   Total Treatment Duration:  PT Patient Time In/Time Out  Time In: 1110  Time Out: [address] Juan C Gill, PT    Future Appointments   Date Time Provider Alok Cox   11/30/2018  1:00 PM Desirae Robles PT [facility]   12/5/2018  2:30 PM Jennie Barthel, DO [facility]   1/2/2019 10:40 AM Vaughn Xavier DPM [address] 1/7/2019  9:15 AM MD Nilesh Tidwell [address]

## 2018-11-30 ENCOUNTER — HOSPITAL ENCOUNTER (OUTPATIENT)
Dept: PHYSICAL THERAPY | Age: 63
Discharge: HOME | End: 2018-11-30
Payer: MEDICARE

## 2018-11-30 PROCEDURE — G8980 MOBILITY D/C STATUS: HCPCS

## 2018-11-30 PROCEDURE — 97110 THERAPEUTIC EXERCISES: CPT

## 2018-11-30 PROCEDURE — G8979 MOBILITY GOAL STATUS: HCPCS

## 2018-11-30 NOTE — THERAPY DISCHARGE
Susannah Ragland  : 1955  Primary: Francine Nolen Medicare [place]  Secondary: [place] Of [facility]  [address], [address], [address]  [phone]   [phone]        OUTPATIENT PHYSICAL [address] Note and Discharge 2018   ICD-10: Treatment Diagnosis: Difficulty in walking, not elsewhere classified (R26.2)  Precautions/Allergies:   Bactrim [sulfamethoprim ds]   Fall Risk Score: 6 (? 5 = High Risk)  MD Orders: Evaluate and treat  MEDICAL/REFERRING DIAGNOSIS:  Type 2 diabetes mellitus with other diabetic neurological complication [C17.81]   DATE OF ONSET: Chronic- over the past several years   REFERRING PHYSICIAN: Chanell Xavier DPM  RETURN PHYSICIAN APPOINTMENT: unknown      INITIAL ASSESSMENT:  Ms. La Cabrera presents with decreased lower extremity strength, imbalance, and difficulty walking due to peripheral neuropathy. Patient is at higher fall risk based on history of falls and score received on Roman Balance Scale. Patient would benefit from skilled physical therapy to address problems and goals. Thank you for this referral.    Discharge note:  Patient attended five scheduled physical therapy appointments from 10/22/18 to 18. Patient demonstrates improved gait speed. Patient's balance has remained the same. Patient would like to continue independently with HEP. Patient discharged from physical therapy to her wishes. Patient told to consult back to therapy if she has a decline in function. Patient agreeable with this plan. Thank you for this referral.      PROBLEM LIST (Impacting functional limitations):  1. Decreased Strength  2. Decreased ADL/Functional Activities  3. Decreased Transfer Abilities  4. Decreased Ambulation Ability/Technique  5. Decreased Balance  6. Decreased Activity Tolerance  7. Increased Fatigue  8. Increased Shortness of Breath  9.  Decreased [place] with Home Exercise Program INTERVENTIONS PLANNED:  1. Balance Exercise  2. Gait Training  3. Home Exercise Program (HEP)  4. Neuromuscular Re-education/Strengthening  5. Therapeutic Exercise/Strengthening   TREATMENT PLAN:  Effective Dates: 10/22/2018 TO 1/21/2019 (90 days). Frequency/Duration: 1-2 times a week for 90 Days. Patient discharged from physical therapy due to her wishes. GOALS: (Goals have been discussed and agreed upon with patient.)  Short-Term Functional Goals: Time Frame: 30 days   1. Patient will be independent with home exercise program to improve balance. Goal met. 2. Patient will score less than or equal to 25 seconds on TUG indicating improvement. Goal met. Discharge Goals: Time Frame: 60 days  1. Patient will score less than or equal to 20 seconds on TUG indicating improvement. Goal not met. 2. Patient will score greater than or equal to 25 on Roman Balance Scale indicating improved balance and decreased fall risk with daily activities. Goal not met. 3. Patient will report improved stability with daily activities. Goal met. Rehabilitation Potential For Stated Goals: Fair            The information in this section was collected on 10/22/2018 (except where otherwise noted). HISTORY:   History of Present Injury/Illness (Reason for Referral):  Patient reports she tends to lose her balance backwards. Patient has had one fall over the past couple of months. Patient has been using a rolling walker since 2012. Patient complains of imbalance, weakness, and difficulty walking. Patient wears bilateral AFOs. Patient rates dizziness as 0/10 and pain level as 0/10.     Past Medical History/Comorbidities:   Ms. Salvatore Lyon  has a past medical history of Bladder problem, Cataract, Chronic back pain, Chronic pain, Claudication, intermittent (HCC), Closed fracture of lateral malleolus, Dermatophytosis of nail, Diabetes ([facility] [address] [address] Dysuria, Hypercholesterolemia, Hypertension, Hypertriglyceridemia, Ingrown toenail, Mixed hyperlipidemia, Neuropathy, Neuropathy associated with endocrine disorder ([facility] Neuropathy in diabetes ([facility] Osteoporosis, unspecified, Overactive bladder, Paronychia of toe, left, RLS (restless legs syndrome), Screening for colon cancer, Type 2 diabetes mellitus ([facility] Type II or unspecified type diabetes mellitus with neurological manifestations, not stated as uncontrolled(250.60) ([facility] Ulcer of toe, chronic ([facility] and Urge incontinence. Ms. Telma Myers  has a past surgical history that includes hx mohs procedure (Right, 2005); hx orthopaedic (Left, 10/2012); hx colonoscopy (2009); hx open cholecystectomy (1990); hx orthopaedic (6/2013); hx orthopaedic (Left, 12/2012); COLONOSCOPY/ 28 (N/A, 9/17/2014); and ADDUCTOR TENOTOMY (Bilateral, 6/6/2013). Social History/Living Environment:     Lives with nephew and fiancee. Prior Level of Function/Work/Activity:  Independent. Retired. Dominant Side:         RIGHT  Personal Factors:          Sex:  female        Age:  58 y.o. Current Medications:       Current Outpatient Medications:     ciclopirox (CICLODAN) 0.77 % topical cream, Apply  to affected area daily. Apply once daily to affected toenails, Disp: 90 g, Rfl: 2    linaclotide (LINZESS) 145 mcg cap capsule, Take 1 Cap by mouth Daily (before breakfast) for 180 days. , Disp: 90 Cap, Rfl: 1    rosuvastatin (CRESTOR) 20 mg tablet, Take 1 Tab by mouth nightly for 360 days. , Disp: 90 Tab, Rfl: 3    mirabegron ER (MYRBETRIQ) 50 mg ER tablet, Take 1 Tab by mouth daily for 360 days. , Disp: 90 Tab, Rfl: 3    colesevelam (WELCHOL) 625 mg tablet, Take 3 tablets by mouth two times daily, Disp: 540 Tab, Rfl: 3    rOPINIRole (REQUIP) 2 mg tablet, Take 1 tablet by mouth  every night at bedtime, Disp: 90 Tab, Rfl: 3    metFORMIN (GLUCOPHAGE) 1,000 mg tablet, Take 1 tablet by mouth  daily, Disp: 90 Tab, Rfl: 3    lisinopril (PRINIVIL, ZESTRIL) 20 mg tablet, Take 1 Tab by mouth daily.  In the morning  Indications: hypertension, Nondiabetic Proteinuric Nephropathy, Disp: 90 Tab, Rfl: 3    glucose blood VI test strips (ACCU-CHEK RON PLUS TEST STRP) strip, Test twice daily, Disp: 100 Strip, Rfl: 3    Lancets misc, Accu-chek softclix lancets, Disp: 100 Each, Rfl: 3    alendronate (FOSAMAX) 35 mg tablet, Take 1 Tab by mouth every seven (7) days. , Disp: 13 Tab, Rfl: 3    aspirin 81 mg chewable tablet, Take 81 mg by mouth daily. , Disp: , Rfl:    Date Last Reviewed:  11/30/18   Number of Personal Factors/Comorbidities that affect the Plan of Care: 3+: HIGH COMPLEXITY   EXAMINATION:   Observation/Orthostatic Postural Assessment: Forward head/rounded shoulders posture   Functional Mobility:         Gait/Ambulation:  Patient ambulates with rolling walker with decreased gait speed with wide base of support due to imbalance. Strength:          Hip flexion 4-/5, hip abduction 3-/5, hip adduction 4-/5, quadriceps 4-/5, hamstrings 4-/5, ankle dorsiflexion 1/5, and ankle plantarflexion 1/5. Sensation:         Decreased bilateral feet. Postural Control & Balance:  · Roman Balance Scale:  16/56.   (A score less than 45/56 indicates high risk of falls)     · Dynamic Gait Index:  Not tested. Body Structures Involved:  1. Nerves  2. Muscles Body Functions Affected:  1. Neuromusculoskeletal Activities and Participation Affected:  1. General Tasks and Demands  2. Mobility  3. Interpersonal Interactions and Relationships  4. Community, Social and [place]   Number of elements (examined above) that affect the Plan of Care: 4+: HIGH COMPLEXITY   CLINICAL PRESENTATION:   Presentation: Evolving clinical presentation with unstable and unpredictable characteristics: HIGH COMPLEXITY   CLINICAL DECISION MAKING:   Outcome Measure: Tool Used: Timed Up and Go (TUG)  Score:  Initial: 33.45 seconds Most Recent: 24.5 seconds (Date: 11-30-18)   Interpretation of Score:  The test measures, in seconds, the time taken by an individual to stand up from a standard arm chair (seat height 46 cm [18 in], arm height 65 cm [25.6 in]), walk a distance of 3 meters (118 in, approx 10 ft), turn, walk back to the chair and sit down. If the individual takes longer than 14 seconds to complete TUG, this indicates risk for falls. Score 7 7.5-10.5 11-14 14.5-17.5 18-21 21.5-24.5 25+   Modifier CH CI CJ CK CL CM CN     ? Mobility - Walking and Moving Around:     - CURRENT STATUS: CM - 80%-99% impaired, limited or restricted    - GOAL STATUS: CL - 60%-79% impaired, limited or restricted    - D/C STATUS:  CM - 80%-99% impaired, limited or restricted    ·    Use of outcome tool(s) and clinical judgement create a POC that gives a: Difficult prediction of patient's progress: HIGH COMPLEXITY            TREATMENT:   (In addition to Assessment/Re-Assessment sessions the following treatments were rendered)  Pre-treatment Symptoms/Complaints:  Patient doing okay. \"I had one fall. My feet got tangled up getting to the bedside commode last Friday and I fell\". Pain: Initial:   Pain Intensity 1: 0  Post Session:  0     THERAPEUTIC EXERCISE: (45 minutes):  Exercises per grid below to improve strength. Required minimal verbal cues to promote proper body alignment. Progressed repetitions as indicated.     Date:  11/30/2018 Date:  11/1/18 Date:  11/9/18 Date:  11/16/18   Activity/Exercise   Parameters Parameters   Standing hip flexion X 15 reps B 1# HEP X 10 reps B 1# X 15 reps B 1#   Standing hip abduction X 15 reps B 1# HEP x10 reps B 1# X 15 reps B 1#   Standing hamstring curls X 15 reps B 1# HEP x10 reps B 1# X 15 reps B 1#   Standing hip extension X 15 reps B 1# HEP X X   Step ups  6 inch  2x10 X 10 reps 6 inch  2x10 6 inch  2x10   Step over half foam 2x10 reps 2x10 reps 2x10 rep 2x10 reps   Sit to stand from hilo mat X  2x10 reps 2x10 reps 2x10 reps   Nustep Level 3 x 10 minutes Level 3 x 5 minutes Level 3 x 8 minutes Level 3 x 8 minutes   Step taps 6 inch 2x10B  6 inch 2x10B 6 inch 2x10B   Seated knee extension X 15 reps B 1#  X 10 reps B 1# X 15 reps B 1#            MedBridge Portal  Treatment/Session Assessment:    · Response to Treatment:  Patient tolerated exercises well. · Compliance with Program/Exercises: Compliant. · Recommendations/Intent for next treatment session: Patient discharged from physical therapy.   Total Treatment Duration:  PT Patient Time In/Time Out  Time In: 1300  Time Out: [address] [facility], PT    Future Appointments   Date Time Provider Alok Chisholmi   12/5/2018  2:30 PM Mikael Woo [place] [facility]SU[facility] BSUG   1/2/2019 10:40 AM Marcia Xavier DPM [address] 1/7/2019  9:15 AM MD Nilesh Sal [address]

## 2018-12-12 PROBLEM — R35.1 NOCTURIA: Status: ACTIVE | Noted: 2018-12-12

## 2019-01-14 ENCOUNTER — HOSPITAL ENCOUNTER (OUTPATIENT)
Dept: ULTRASOUND IMAGING | Age: 64
Discharge: HOME | End: 2019-01-14
Attending: PODIATRIST
Payer: MEDICARE

## 2019-01-14 DIAGNOSIS — E11.21 TYPE 2 DIABETES MELLITUS WITH NEPHROPATHY (HCC): ICD-10-CM

## 2019-01-14 DIAGNOSIS — R20.9 BILATERAL COLD FEET: ICD-10-CM

## 2019-01-14 DIAGNOSIS — E11.49 TYPE II DIABETES MELLITUS WITH NEUROLOGICAL MANIFESTATIONS NOT AT GOAL (HCC): ICD-10-CM

## 2019-01-14 PROCEDURE — 93925 LOWER EXTREMITY STUDY: CPT

## 2019-06-06 ENCOUNTER — HOSPITAL ENCOUNTER (OUTPATIENT)
Dept: MAMMOGRAPHY | Age: 64
Discharge: HOME | End: 2019-06-06
Attending: FAMILY MEDICINE

## 2019-06-06 DIAGNOSIS — Z12.31 VISIT FOR SCREENING MAMMOGRAM: ICD-10-CM

## 2020-06-08 ENCOUNTER — HOSPITAL ENCOUNTER (OUTPATIENT)
Dept: MAMMOGRAPHY | Age: 65
Discharge: HOME | End: 2020-06-08
Attending: FAMILY MEDICINE

## 2020-06-08 DIAGNOSIS — Z12.31 VISIT FOR SCREENING MAMMOGRAM: ICD-10-CM

## 2020-12-15 PROBLEM — R35.1 NOCTURIA: Status: RESOLVED | Noted: 2018-12-12 | Resolved: 2020-12-15

## 2020-12-15 PROBLEM — G56.00 CARPAL TUNNEL SYNDROME: Status: RESOLVED | Noted: 2018-05-15 | Resolved: 2020-12-15

## 2020-12-15 PROBLEM — R87.619 ABNORMAL PAP SMEAR OF CERVIX: Status: ACTIVE | Noted: 2020-12-15

## 2020-12-15 PROBLEM — Z12.31 ENCOUNTER FOR SCREENING MAMMOGRAM FOR MALIGNANT NEOPLASM OF BREAST: Status: ACTIVE | Noted: 2020-12-15

## 2020-12-15 PROBLEM — Z11.59 NEED FOR HEPATITIS C SCREENING TEST: Status: ACTIVE | Noted: 2020-12-15

## 2020-12-15 PROBLEM — Z87.898 HISTORY OF PROGRESSIVE WEAKNESS: Status: RESOLVED | Noted: 2018-05-15 | Resolved: 2020-12-15

## 2020-12-16 PROBLEM — K59.09 CHRONIC CONSTIPATION: Status: ACTIVE | Noted: 2020-12-16

## 2021-01-07 PROBLEM — R87.610 ASCUS WITH POSITIVE HIGH RISK HPV CERVICAL: Status: ACTIVE | Noted: 2020-12-15

## 2021-01-07 PROBLEM — R87.810 ASCUS WITH POSITIVE HIGH RISK HPV CERVICAL: Status: ACTIVE | Noted: 2020-12-15

## 2021-01-07 PROBLEM — Z01.419 WOMEN'S ANNUAL ROUTINE GYNECOLOGICAL EXAMINATION: Status: ACTIVE | Noted: 2021-01-07

## 2021-03-05 ENCOUNTER — HOSPITAL ENCOUNTER (OUTPATIENT)
Dept: MAMMOGRAPHY | Age: 66
Discharge: HOME | End: 2021-03-05
Attending: OBSTETRICS & GYNECOLOGY
Payer: COMMERCIAL

## 2021-03-05 DIAGNOSIS — M85.851 OSTEOPENIA OF BOTH HIPS: ICD-10-CM

## 2021-03-05 DIAGNOSIS — M85.852 OSTEOPENIA OF BOTH HIPS: ICD-10-CM

## 2021-03-05 PROCEDURE — 77080 DXA BONE DENSITY AXIAL: CPT

## 2021-03-16 PROBLEM — Z23 ENCOUNTER FOR IMMUNIZATION: Status: ACTIVE | Noted: 2021-03-16

## 2021-03-16 PROBLEM — Z00.00 MEDICARE ANNUAL WELLNESS VISIT, SUBSEQUENT: Chronic | Status: ACTIVE | Noted: 2021-03-16

## 2021-03-16 PROBLEM — Z11.59 NEED FOR HEPATITIS C SCREENING TEST: Status: RESOLVED | Noted: 2020-12-15 | Resolved: 2021-03-16

## 2021-03-16 PROBLEM — Z00.00 MEDICARE ANNUAL WELLNESS VISIT, SUBSEQUENT: Status: ACTIVE | Noted: 2021-03-16

## 2021-05-19 ENCOUNTER — TRANSCRIBE ORDER (OUTPATIENT)
Dept: SCHEDULING | Age: 66
End: 2021-05-19

## 2021-05-19 DIAGNOSIS — Z12.31 ENCOUNTER FOR SCREENING MAMMOGRAM FOR MALIGNANT NEOPLASM OF BREAST: Primary | ICD-10-CM

## 2021-06-10 ENCOUNTER — HOSPITAL ENCOUNTER (OUTPATIENT)
Dept: MAMMOGRAPHY | Age: 66
Discharge: HOME | End: 2021-06-10
Attending: INTERNAL MEDICINE

## 2021-06-10 DIAGNOSIS — Z12.31 ENCOUNTER FOR SCREENING MAMMOGRAM FOR MALIGNANT NEOPLASM OF BREAST: ICD-10-CM

## 2022-03-15 PROBLEM — Z12.39 BREAST CANCER SCREENING: Status: ACTIVE | Noted: 2020-12-15

## 2022-03-19 PROBLEM — Z12.39 BREAST CANCER SCREENING: Status: ACTIVE | Noted: 2020-12-15

## 2022-03-19 PROBLEM — R87.610 ASCUS WITH POSITIVE HIGH RISK HPV CERVICAL: Status: ACTIVE | Noted: 2020-12-15

## 2022-03-19 PROBLEM — Z01.419 WOMEN'S ANNUAL ROUTINE GYNECOLOGICAL EXAMINATION: Status: ACTIVE | Noted: 2021-01-07

## 2022-03-19 PROBLEM — K59.09 CHRONIC CONSTIPATION: Status: ACTIVE | Noted: 2020-12-16

## 2022-03-19 PROBLEM — Z23 ENCOUNTER FOR IMMUNIZATION: Status: ACTIVE | Noted: 2021-03-16

## 2022-03-19 PROBLEM — Z00.00 MEDICARE ANNUAL WELLNESS VISIT, SUBSEQUENT: Status: ACTIVE | Noted: 2021-03-16

## 2022-03-19 PROBLEM — R87.810 ASCUS WITH POSITIVE HIGH RISK HPV CERVICAL: Status: ACTIVE | Noted: 2020-12-15

## 2022-03-20 PROBLEM — M21.372 BILATERAL FOOT-DROP: Status: ACTIVE | Noted: 2018-05-15

## 2022-03-20 PROBLEM — M21.371 BILATERAL FOOT-DROP: Status: ACTIVE | Noted: 2018-05-15

## 2022-04-14 PROBLEM — Z12.39 BREAST CANCER SCREENING: Status: RESOLVED | Noted: 2020-12-15 | Resolved: 2022-04-14

## 2022-05-11 DIAGNOSIS — E11.49 TYPE II DIABETES MELLITUS WITH NEUROLOGICAL MANIFESTATIONS NOT AT GOAL (HCC): Primary | ICD-10-CM

## 2022-05-17 PROBLEM — L89.301 PRESSURE INJURY OF BUTTOCK, STAGE 1: Status: ACTIVE | Noted: 2022-05-17

## 2022-06-22 ENCOUNTER — OFFICE VISIT (OUTPATIENT)
Dept: INTERNAL MEDICINE CLINIC | Facility: CLINIC | Age: 67
End: 2022-06-22
Payer: COMMERCIAL

## 2022-06-22 VITALS
HEART RATE: 111 BPM | DIASTOLIC BLOOD PRESSURE: 54 MMHG | HEIGHT: 61 IN | OXYGEN SATURATION: 99 % | TEMPERATURE: 96.8 F | SYSTOLIC BLOOD PRESSURE: 112 MMHG | BODY MASS INDEX: 22.64 KG/M2

## 2022-06-22 DIAGNOSIS — G25.81 RESTLESS LEG SYNDROME: ICD-10-CM

## 2022-06-22 DIAGNOSIS — G62.9 NEUROPATHY: Primary | ICD-10-CM

## 2022-06-22 DIAGNOSIS — I10 PRIMARY HYPERTENSION: ICD-10-CM

## 2022-06-22 DIAGNOSIS — M81.0 AGE-RELATED OSTEOPOROSIS WITHOUT CURRENT PATHOLOGICAL FRACTURE: ICD-10-CM

## 2022-06-22 DIAGNOSIS — Z23 ENCOUNTER FOR IMMUNIZATION: ICD-10-CM

## 2022-06-22 DIAGNOSIS — M21.372 BILATERAL FOOT-DROP: ICD-10-CM

## 2022-06-22 DIAGNOSIS — E11.42 DIABETIC POLYNEUROPATHY ASSOCIATED WITH TYPE 2 DIABETES MELLITUS (HCC): ICD-10-CM

## 2022-06-22 DIAGNOSIS — R80.9 TYPE 2 DIABETES MELLITUS WITH MICROALBUMINURIA, WITHOUT LONG-TERM CURRENT USE OF INSULIN (HCC): ICD-10-CM

## 2022-06-22 DIAGNOSIS — N32.81 OVERACTIVE BLADDER: ICD-10-CM

## 2022-06-22 DIAGNOSIS — E78.2 MIXED HYPERLIPIDEMIA: ICD-10-CM

## 2022-06-22 DIAGNOSIS — E11.29 TYPE 2 DIABETES MELLITUS WITH MICROALBUMINURIA, WITHOUT LONG-TERM CURRENT USE OF INSULIN (HCC): ICD-10-CM

## 2022-06-22 DIAGNOSIS — M21.371 BILATERAL FOOT-DROP: ICD-10-CM

## 2022-06-22 PROBLEM — L89.301 PRESSURE INJURY OF BUTTOCK, STAGE 1: Status: ACTIVE | Noted: 2022-05-17

## 2022-06-22 PROCEDURE — 3044F HG A1C LEVEL LT 7.0%: CPT | Performed by: INTERNAL MEDICINE

## 2022-06-22 PROCEDURE — 1123F ACP DISCUSS/DSCN MKR DOCD: CPT | Performed by: INTERNAL MEDICINE

## 2022-06-22 PROCEDURE — 99213 OFFICE O/P EST LOW 20 MIN: CPT | Performed by: INTERNAL MEDICINE

## 2022-06-22 RX ORDER — GABAPENTIN 300 MG/1
300 CAPSULE ORAL NIGHTLY
Qty: 30 CAPSULE | Refills: 5 | Status: SHIPPED | OUTPATIENT
Start: 2022-06-22 | End: 2022-09-20 | Stop reason: SDUPTHER

## 2022-06-22 ASSESSMENT — ENCOUNTER SYMPTOMS
VOICE CHANGE: 0
RECTAL PAIN: 0
EYE PAIN: 0
STRIDOR: 0

## 2022-06-22 NOTE — PROGRESS NOTES
FOLLOWUP VISIT    Subjective:    Ms. Анна Stein is a 75 y.o., female,   Chief Complaint   Patient presents with    Follow-up     c/o worsening neuropathy       HPI:    The patient presents today complaining of worsening neuropathy. Symptoms present for > 20 years. Presented initially with stocking distribution numbness. States she was evaluated by neurologist Dr. Charley Martinez > 10 years ago. She believes she had what sounds like EMG / NCS and various blood tests. She believes she was diagnosed with diabetes years before her neuropathy and that her neuropathy was felt secondary to her diabetes. \"All of that was so long ago it is hard to remember. \"  Over the last 5-10 years her neuropathy has slowly worsened. She has symmetric bilateral lower extremity weakness and numbness and tingling pain. She required the use of a a walker and then a walker + bilateral AFO braces and now is becoming increasingly wheelchair dependent. Patient and  would like to be re-evaluated by neurology. The following portions of the patient's history were reviewed and updated as appropriate:      Past Medical History:   Diagnosis Date    Bilateral foot-drop     Due to diabetic polyneuropathy. AFO braces.       Cataract     Chronic back pain 9/8/2014    Diabetic neuropathy ([address]     Hypertension     Mixed hyperlipidemia 6/12/2015    Need for hepatitis C screening test 12/15/2020    9/27/16 HCV antibody negative    Onychomycosis 12/16/2016    Osteoporosis 06/12/2015    Overactive bladder 6/12/2015    RLS (restless legs syndrome)     Type 2 diabetes mellitus ([address] 9/8/2014    Ulcer of toe, chronic ([address] 12/16/2016       Past Surgical History:   Procedure Laterality Date    CHOLECYSTECTOMY, OPEN  1990    COLONOSCOPY  2009    MOHS SURGERY Right 2005    ORTHOPEDIC SURGERY Left 10/2012    fx ankle with pins    [facility]    ORTHOPEDIC SURGERY Left 12/2012    ankle scottie       Family History   Problem Relation Age of Onset    Heart Disease Mother     Other Brother         heart problems    Breast Cancer Maternal Aunt     Ovarian Cancer Neg Hx     Colon Cancer Neg Hx     Uterine Cancer Neg Hx        Social History     Socioeconomic History    Marital status:      Spouse name: Not on file    Number of children: Not on file    Years of education: Not on file    Highest education level: Not on file   Occupational History    Not on file   Tobacco Use    Smoking status: Passive Smoke Exposure - Never Smoker    Smokeless tobacco: Never Used   Substance and Sexual Activity    Alcohol use: No     Alcohol/week: 0.0 standard drinks    Drug use: No    Sexual activity: Not on file   Other Topics Concern    Not on file   Social History Narrative    Lives at home with her nephew     Social Determinants of Health     Financial Resource Strain:     Difficulty of Paying Living Expenses: Not on file   Food Insecurity:     Worried About [address] [employer] in the Last Year: Not on file    [employer] [employer] in the Last Year: Not on file   Transportation Needs:     Lack of Transportation (Medical): Not on file    Lack of Transportation (Non-Medical):  Not on file   Physical Activity:     Days of Exercise per Week: Not on file    Minutes of Exercise per Session: Not on file   Stress:     Feeling of Stress : Not on file   Social Connections:     Frequency of Communication with Friends and Family: Not on file    Frequency of Social Gatherings with Friends and Family: Not on file    Attends Congregation Services: Not on file    Active Member of Clubs or Organizations: Not on file    Attends Club or Organization Meetings: Not on file    Marital Status: Not on file   Intimate Partner Violence:     Fear of Current or Ex-Partner: Not on file    Emotionally Abused: Not on file    Physically Abused: Not on file    Sexually Abused: Not on file   Housing Stability:     Unable to Pay for Housing in the Last Year: Not on file    Number of Places Lived in the Last Year: Not on file    Unstable Housing in the Last Year: Not on file       Current Outpatient Medications   Medication Sig Dispense Refill    alendronate (FOSAMAX) 35 MG tablet Take 35 mg by mouth every 7 days      aspirin 81 MG chewable tablet Take 81 mg by mouth daily      losartan (COZAAR) 100 MG tablet Take 100 mg by mouth daily      metFORMIN (GLUCOPHAGE) 1000 MG tablet Take 1,000 mg by mouth 2 times daily      mirabegron (MYRBETRIQ) 50 MG TB24 Take 50 mg by mouth daily      rOPINIRole (REQUIP) 2 MG tablet Take 1 tablet by mouth every night at bedtime      rosuvastatin (CRESTOR) 20 MG tablet Take 20 mg by mouth       No current facility-administered medications for this visit. Allergies as of 06/22/2022 - Fully Reviewed 06/22/2022   Allergen Reaction Noted    Sulfa antibiotics Other (See Comments) 12/16/2016    Sulfamethoxazole-trimethoprim Other (See Comments) 05/31/2020       Review of Systems   Constitutional: Negative for activity change and appetite change. HENT: Negative for drooling and voice change. Eyes: Negative for pain. Respiratory: Negative for stridor. Gastrointestinal: Negative for rectal pain. Endocrine: Negative for polydipsia and polyphagia. Genitourinary: Negative for dyspareunia and enuresis. Musculoskeletal: Negative for gait problem and neck stiffness. Skin: Negative for pallor. Neurological: Negative for facial asymmetry and speech difficulty. Hematological: Does not bruise/bleed easily. Psychiatric/Behavioral: Negative for self-injury. The patient is not hyperactive.              Patient Care Team:  Brionna Paz MD as PCP - General  Brionna Paz MD as PCP - [address] [place], [place] [address]kathleen Mckay [employer] Provider    Objective:    BP (!) 112/54 (Site: Left Upper Arm, Position: Sitting)   Pulse (!) 111   Temp 96.8 °F (36 °C) (Temporal)   Ht 5' 1\" (1.549 m)   SpO2 99%   BMI 22.64 kg/m²     Physical Exam  Vitals reviewed. Constitutional:       General: She is not in acute distress. Appearance: She is not toxic-appearing. HENT:      Head: Normocephalic and atraumatic. Right Ear: Tympanic membrane, ear canal and external ear normal.      Left Ear: Tympanic membrane, ear canal and external ear normal.      Nose: Nose normal.      Mouth/Throat:      Mouth: Mucous membranes are moist.      Pharynx: Oropharynx is clear. Eyes:      General: No scleral icterus. Extraocular Movements: Extraocular movements intact. Pupils: Pupils are equal, round, and reactive to light. Cardiovascular:      Rate and Rhythm: Normal rate and regular rhythm. Pulses: Normal pulses. Heart sounds: Normal heart sounds. Pulmonary:      Effort: Pulmonary effort is normal. No respiratory distress. Breath sounds: Normal breath sounds. No stridor. Abdominal:      General: Abdomen is flat. Bowel sounds are normal.      Palpations: Abdomen is soft. There is no mass. Tenderness: There is no guarding or rebound. Musculoskeletal:         General: Normal range of motion. Cervical back: Normal range of motion and neck supple. Skin:     General: Skin is warm and dry. Coloration: Skin is not jaundiced. Neurological:      Mental Status: She is alert and oriented to person, place, and time. Psychiatric:         Behavior: Behavior normal.         Thought Content:  Thought content normal.              [employer] Historical Encounter on 03/08/2022   Component Date Value Ref Range Status    Hemoglobin A1C 03/08/2022 6.1* 4.8 - 5.6 % Final    Comment:          Prediabetes: 5.7 - 6.4           Diabetes: >6.4           Glycemic control for adults with diabetes: <7.0      eAG 03/08/2022 128  mg/dL Final    Glucose 03/08/2022 93  65 - 99 mg/dL Final    BUN 03/08/2022 15  8 - 27 mg/dL Final    CREATININE 03/08/2022 0.98  0.57 - 1.00 mg/dL Final    EGFR 03/08/2022 63  >59 mL/min/1.73 Final    Bun/Cre Ratio 03/08/2022 15  12 - 28 NA Final    Sodium 03/08/2022 139  134 - 144 mmol/L Final    Potassium 03/08/2022 5.3* 3.5 - 5.2 mmol/L Final    Chloride 03/08/2022 103  96 - 106 mmol/L Final    CO2 03/08/2022 17* 20 - 29 mmol/L Final    Calcium 03/08/2022 10.3  8.7 - 10.3 mg/dL Final    Total Protein 03/08/2022 7.0  6.0 - 8.5 g/dL Final    Albumin 03/08/2022 4.7  3.8 - 4.8 g/dL Final    Globulin, Total 03/08/2022 2.3  1.5 - 4.5 g/dL Final    Albumin/Globulin Ratio 03/08/2022 2.0  1.2 - 2.2 NA Final    Total Bilirubin 03/08/2022 0.4  0.0 - 1.2 mg/dL Final    Alkaline Phosphatase 03/08/2022 79  44 - 121 IU/L Final    AST 03/08/2022 21  0 - 40 IU/L Final    ALT 03/08/2022 16  0 - 32 IU/L Final    Cholesterol, Total 03/08/2022 129  100 - 199 mg/dL Final    Triglycerides 03/08/2022 142  0 - 149 mg/dL Final    HDL 03/08/2022 52  >39 mg/dL Final    VLDL 03/08/2022 24  5 - 40 mg/dL Final    LDL Calculated 03/08/2022 53  0 - 99 mg/dL Final         Assessent & Plan:        1. Neuropathy  -     Ifeanyi Castillo MD, Neurology, [facility]  2. Type 2 diabetes mellitus with microalbuminuria, without long-term current use of insulin ([facility])  Overview:  3/8/22 A1C 6.1  9/8/21 , A1C 6.3, urine microalbumin 307  3/9/21 , A1C 6.4, urine microalbumin 353    I discussed diabetes mellitus with the patient and provided counseling. We reviewed signs and symptoms of hypo and hyperglycemia. The patient was instructed regarding management strategies. We discussed the need for good glycemic control to avoid long-term complications, and the need for yearly diabetic eye and foot examinations. The patient will continue the current treatment. With respect to her microalbuminuria, she is on max dose losartan. Her BP is well controlled. Will monitor. 3. Restless leg syndrome  Overview:  3/9/21 ferritin 50. Symptoms improved with Requip. The patient will continue the current treatment.         4. R-5Normal        The patient and/or patient representative voiced understanding and agreement with the current diagnoses, recommendations, and possible side effects. Return for appointment as previously scheduled.

## 2022-09-13 DIAGNOSIS — E11.49 TYPE II DIABETES MELLITUS WITH NEUROLOGICAL MANIFESTATIONS NOT AT GOAL (HCC): ICD-10-CM

## 2022-09-13 DIAGNOSIS — Z12.31 ENCOUNTER FOR SCREENING MAMMOGRAM FOR BREAST CANCER: ICD-10-CM

## 2022-09-14 LAB
ALBUMIN SERPL-MCNC: 4.1 G/DL (ref 3.2–4.6)
ALBUMIN/GLOB SERPL: 1.4 {RATIO} (ref 1.2–3.5)
ALP SERPL-CCNC: 82 U/L (ref 50–136)
ALT SERPL-CCNC: 37 U/L (ref 12–65)
ANION GAP SERPL CALC-SCNC: 7 MMOL/L (ref 4–13)
AST SERPL-CCNC: 36 U/L (ref 15–37)
BILIRUB SERPL-MCNC: 0.6 MG/DL (ref 0.2–1.1)
BUN SERPL-MCNC: 17 MG/DL (ref 8–23)
CALCIUM SERPL-MCNC: 9.8 MG/DL (ref 8.3–10.4)
CHLORIDE SERPL-SCNC: 105 MMOL/L (ref 101–110)
CO2 SERPL-SCNC: 26 MMOL/L (ref 21–32)
CREAT SERPL-MCNC: 0.7 MG/DL (ref 0.6–1)
EST. AVERAGE GLUCOSE BLD GHB EST-MCNC: 123 MG/DL
GLOBULIN SER CALC-MCNC: 3 G/DL (ref 2.3–3.5)
GLUCOSE SERPL-MCNC: 104 MG/DL (ref 65–100)
HBA1C MFR BLD: 5.9 % (ref 4.8–5.6)
POTASSIUM SERPL-SCNC: 4.5 MMOL/L (ref 3.5–5.1)
PROT SERPL-MCNC: 7.1 G/DL (ref 6.3–8.2)
SODIUM SERPL-SCNC: 138 MMOL/L (ref 136–145)

## 2022-09-20 ENCOUNTER — OFFICE VISIT (OUTPATIENT)
Dept: INTERNAL MEDICINE CLINIC | Facility: CLINIC | Age: 67
End: 2022-09-20
Payer: COMMERCIAL

## 2022-09-20 VITALS
OXYGEN SATURATION: 98 % | BODY MASS INDEX: 22.47 KG/M2 | SYSTOLIC BLOOD PRESSURE: 125 MMHG | HEIGHT: 61 IN | TEMPERATURE: 98.6 F | WEIGHT: 119 LBS | HEART RATE: 114 BPM | DIASTOLIC BLOOD PRESSURE: 69 MMHG

## 2022-09-20 DIAGNOSIS — E78.2 MIXED HYPERLIPIDEMIA: ICD-10-CM

## 2022-09-20 DIAGNOSIS — I10 PRIMARY HYPERTENSION: ICD-10-CM

## 2022-09-20 DIAGNOSIS — Z23 ENCOUNTER FOR IMMUNIZATION: ICD-10-CM

## 2022-09-20 DIAGNOSIS — R80.9 TYPE 2 DIABETES MELLITUS WITH MICROALBUMINURIA, WITHOUT LONG-TERM CURRENT USE OF INSULIN (HCC): ICD-10-CM

## 2022-09-20 DIAGNOSIS — E11.29 TYPE 2 DIABETES MELLITUS WITH MICROALBUMINURIA, WITHOUT LONG-TERM CURRENT USE OF INSULIN (HCC): ICD-10-CM

## 2022-09-20 DIAGNOSIS — Z12.11 COLON CANCER SCREENING: ICD-10-CM

## 2022-09-20 DIAGNOSIS — G25.81 RESTLESS LEG SYNDROME: ICD-10-CM

## 2022-09-20 DIAGNOSIS — M81.0 AGE-RELATED OSTEOPOROSIS WITHOUT CURRENT PATHOLOGICAL FRACTURE: ICD-10-CM

## 2022-09-20 DIAGNOSIS — N32.81 OVERACTIVE BLADDER: ICD-10-CM

## 2022-09-20 DIAGNOSIS — Z00.00 MEDICARE ANNUAL WELLNESS VISIT, SUBSEQUENT: Primary | ICD-10-CM

## 2022-09-20 DIAGNOSIS — E11.42 DIABETIC POLYNEUROPATHY ASSOCIATED WITH TYPE 2 DIABETES MELLITUS (HCC): ICD-10-CM

## 2022-09-20 PROBLEM — Z12.39 BREAST CANCER SCREENING: Status: ACTIVE | Noted: 2020-12-15

## 2022-09-20 LAB
CREAT UR-MCNC: 15 MG/DL
MICROALBUMIN UR-MCNC: 9.6 MG/DL
MICROALBUMIN/CREAT UR-RTO: 640 MG/G

## 2022-09-20 PROCEDURE — G0439 PPPS, SUBSEQ VISIT: HCPCS | Performed by: INTERNAL MEDICINE

## 2022-09-20 PROCEDURE — 3017F COLORECTAL CA SCREEN DOC REV: CPT | Performed by: INTERNAL MEDICINE

## 2022-09-20 PROCEDURE — G8420 CALC BMI NORM PARAMETERS: HCPCS | Performed by: INTERNAL MEDICINE

## 2022-09-20 PROCEDURE — G8399 PT W/DXA RESULTS DOCUMENT: HCPCS | Performed by: INTERNAL MEDICINE

## 2022-09-20 PROCEDURE — 1090F PRES/ABSN URINE INCON ASSESS: CPT | Performed by: INTERNAL MEDICINE

## 2022-09-20 PROCEDURE — G8427 DOCREV CUR MEDS BY ELIG CLIN: HCPCS | Performed by: INTERNAL MEDICINE

## 2022-09-20 PROCEDURE — 1036F TOBACCO NON-USER: CPT | Performed by: INTERNAL MEDICINE

## 2022-09-20 PROCEDURE — 2022F DILAT RTA XM EVC RTNOPTHY: CPT | Performed by: INTERNAL MEDICINE

## 2022-09-20 PROCEDURE — 99214 OFFICE O/P EST MOD 30 MIN: CPT | Performed by: INTERNAL MEDICINE

## 2022-09-20 PROCEDURE — 1123F ACP DISCUSS/DSCN MKR DOCD: CPT | Performed by: INTERNAL MEDICINE

## 2022-09-20 PROCEDURE — 3044F HG A1C LEVEL LT 7.0%: CPT | Performed by: INTERNAL MEDICINE

## 2022-09-20 PROCEDURE — G0008 ADMIN INFLUENZA VIRUS VAC: HCPCS | Performed by: INTERNAL MEDICINE

## 2022-09-20 PROCEDURE — 90694 VACC AIIV4 NO PRSRV 0.5ML IM: CPT | Performed by: INTERNAL MEDICINE

## 2022-09-20 RX ORDER — LOSARTAN POTASSIUM 100 MG/1
100 TABLET ORAL DAILY
Qty: 90 TABLET | Refills: 1 | Status: SHIPPED | OUTPATIENT
Start: 2022-09-20

## 2022-09-20 RX ORDER — ROSUVASTATIN CALCIUM 20 MG/1
20 TABLET, COATED ORAL DAILY
Qty: 90 TABLET | Refills: 1 | Status: SHIPPED | OUTPATIENT
Start: 2022-09-20

## 2022-09-20 RX ORDER — ALENDRONATE SODIUM 35 MG/1
35 TABLET ORAL
Qty: 12 TABLET | Refills: 1 | Status: SHIPPED | OUTPATIENT
Start: 2022-09-20

## 2022-09-20 RX ORDER — GABAPENTIN 300 MG/1
300 CAPSULE ORAL 2 TIMES DAILY
Qty: 180 CAPSULE | Refills: 3 | Status: SHIPPED | OUTPATIENT
Start: 2022-09-20 | End: 2023-09-15

## 2022-09-20 ASSESSMENT — ENCOUNTER SYMPTOMS
RECTAL PAIN: 0
VOICE CHANGE: 0
STRIDOR: 0
EYE PAIN: 0

## 2022-09-20 ASSESSMENT — PATIENT HEALTH QUESTIONNAIRE - PHQ9
SUM OF ALL RESPONSES TO PHQ QUESTIONS 1-9: 0
SUM OF ALL RESPONSES TO PHQ9 QUESTIONS 1 & 2: 0
SUM OF ALL RESPONSES TO PHQ QUESTIONS 1-9: 0
1. LITTLE INTEREST OR PLEASURE IN DOING THINGS: 0
2. FEELING DOWN, DEPRESSED OR HOPELESS: 0

## 2022-09-20 ASSESSMENT — LIFESTYLE VARIABLES
HOW OFTEN DO YOU HAVE A DRINK CONTAINING ALCOHOL: NEVER
HOW MANY STANDARD DRINKS CONTAINING ALCOHOL DO YOU HAVE ON A TYPICAL DAY: PATIENT DOES NOT DRINK

## 2022-09-20 NOTE — PROGRESS NOTES
FOLLOWUP VISIT AND MEDICARE WELLNESS    Subjective:    Ms. Bao Moulton is a 75 y.o., female,   Chief Complaint   Patient presents with    Follow-up Chronic Condition     Pt reports could not give Urine prior;  submitted sample today    Medicare AWV       HPI:    Patient presents today for follow up of two or more chronic medical problems and review of labs. The patient is also here for the annual Medicare wellness visit. Patient has diabetic neuropathy. Some improvement with gabapentin at bedtime but still complains of symptoms during the day. The patient has diabetes mellitus. The patient reports good blood sugar readings at home. Denies any symptoms of hypo or hyperglycemia. The patient has been attempting to be compliant with an ADA diet and an exercise program.     The patient has hypertension. The patient has been on an attempted low sodium diet and has been trying to exercise and maintain a healthy weight. The patient reports good compliance with the blood pressure medications and good blood pressure readings on home / ambulatory monitoring. The patient has diabetes mellitus. The patient reports good blood sugar readings at home. Denies any symptoms of hypo or hyperglycemia. The patient has been attempting to be compliant with an ADA diet and an exercise program.     The following portions of the patient's history were reviewed and updated as appropriate:      Past Medical History:   Diagnosis Date    Bilateral foot-drop     Due to diabetic polyneuropathy. AFO braces.       Cataract     Chronic back pain 9/8/2014    Diabetic neuropathy ([address]     Hypertension     Mixed hyperlipidemia 6/12/2015    Need for hepatitis C screening test 12/15/2020    9/27/16 HCV antibody negative    Onychomycosis 12/16/2016    Osteoporosis 06/12/2015    Overactive bladder 6/12/2015    RLS (restless legs syndrome)     Type 2 diabetes mellitus ([address] 9/8/2014    Ulcer of toe, chronic ([address] 12/16/2016 Past Surgical History:   Procedure Laterality Date    CHOLECYSTECTOMY, OPEN  1990    COLONOSCOPY  2009    MOHS SURGERY Right 2005    ORTHOPEDIC SURGERY Left 10/2012    fx ankle with pins    ORTHOPEDIC SURGERY  6/2013    hammertoe surgery    ORTHOPEDIC SURGERY Left 12/2012    ankle scottie       Family History   Problem Relation Age of Onset    Heart Disease Mother     Other Brother         heart problems    Breast Cancer Maternal Aunt     Ovarian Cancer Neg Hx     Colon Cancer Neg Hx     Uterine Cancer Neg Hx        Social History     Socioeconomic History    Marital status:      Spouse name: Not on file    Number of children: Not on file    Years of education: Not on file    Highest education level: Not on file   Occupational History    Not on file   Tobacco Use    Smoking status: Never     Passive exposure: Yes    Smokeless tobacco: Never   Substance and Sexual Activity    Alcohol use: No     Alcohol/week: 0.0 standard drinks    Drug use: No    Sexual activity: Not on file   Other Topics Concern    Not on file   Social History Narrative    Lives at home with her nephew     Social Determinants of Health     Financial Resource Strain: Not on file   Food Insecurity: Not on file   Transportation Needs: Not on file   Physical Activity: Unknown    Days of Exercise per Week: 0 days    Minutes of Exercise per Session: Not on file   Stress: Not on file   Social Connections: Not on file   Intimate Partner Violence: Not on file   Housing Stability: Not on file       Current Outpatient Medications   Medication Sig Dispense Refill    gabapentin (NEURONTIN) 300 MG capsule Take 1 capsule by mouth nightly for 180 days.  Intended supply: 30 days 30 capsule 5    alendronate (FOSAMAX) 35 MG tablet Take 35 mg by mouth every 7 days      aspirin 81 MG chewable tablet Take 81 mg by mouth daily      losartan (COZAAR) 100 MG tablet Take 100 mg by mouth daily      metFORMIN (GLUCOPHAGE) 1000 MG tablet Take 1,000 mg by mouth 2 times daily      mirabegron (MYRBETRIQ) 50 MG TB24 Take 50 mg by mouth daily      rosuvastatin (CRESTOR) 20 MG tablet Take 20 mg by mouth       No current facility-administered medications for this visit. Allergies as of 09/20/2022 - Fully Reviewed 06/22/2022   Allergen Reaction Noted    Sulfa antibiotics Other (See Comments) 12/16/2016    Sulfamethoxazole-trimethoprim Other (See Comments) 05/31/2020       Review of Systems   Constitutional:  Negative for activity change and appetite change. HENT:  Negative for drooling and voice change. Eyes:  Negative for pain. Respiratory:  Negative for stridor. Gastrointestinal:  Negative for rectal pain. Endocrine: Negative for polydipsia and polyphagia. Genitourinary:  Negative for dyspareunia and enuresis. Musculoskeletal:  Negative for gait problem and neck stiffness. Skin:  Negative for pallor. Neurological:  Negative for facial asymmetry and speech difficulty. Hematological:  Does not bruise/bleed easily. Psychiatric/Behavioral:  Negative for self-injury. The patient is not hyperactive. Patient Care Team:  Jose Ramon Fernandes MD as PCP - General  Jose Ramon Fernandes MD as PCP - REHABILITATION [facility] Empaneled Provider    Objective:    /69 (Site: Left Upper Arm, Position: Sitting)   Pulse (!) 114   Temp 98.6 °F (37 °C) (Temporal)   Ht 5' 1\" (1.549 m)   Wt 119 lb (54 kg)   SpO2 98%   BMI 22.48 kg/m²     Physical Exam  Vitals reviewed. Constitutional:       General: She is not in acute distress. Appearance: She is not toxic-appearing. HENT:      Head: Normocephalic and atraumatic. Right Ear: Tympanic membrane, ear canal and external ear normal.      Left Ear: Tympanic membrane, ear canal and external ear normal.      Nose: Nose normal.      Mouth/Throat:      Mouth: Mucous membranes are moist.      Pharynx: Oropharynx is clear. Eyes:      General: No scleral icterus. Extraocular Movements: Extraocular movements intact.       Pupils: Pupils are equal, round, and reactive to light. Cardiovascular:      Rate and Rhythm: Normal rate and regular rhythm. Pulses: Normal pulses. Heart sounds: Normal heart sounds. Pulmonary:      Effort: Pulmonary effort is normal. No respiratory distress. Breath sounds: Normal breath sounds. No stridor. Abdominal:      General: Abdomen is flat. Bowel sounds are normal.      Palpations: Abdomen is soft. There is no mass. Tenderness: There is no guarding or rebound. Musculoskeletal:         General: Normal range of motion. Cervical back: Normal range of motion and neck supple. Skin:     General: Skin is warm and dry. Coloration: Skin is not jaundiced. Neurological:      Mental Status: She is alert and oriented to person, place, and time. Mental status is at baseline. Psychiatric:         Behavior: Behavior normal.         Thought Content: Thought content normal.            Orders Only on 09/13/2022   Component Date Value Ref Range Status    Hemoglobin A1C 09/13/2022 5.9 (A) 4.8 - 5.6 % Final    eAG 09/13/2022 123  mg/dL Final    Comment: Reference Range  Normal: 4.8-5.6  Diabetic >=6.5%  Normal       <5.7%      Sodium 09/13/2022 138  136 - 145 mmol/L Final    Potassium 09/13/2022 4.5  3.5 - 5.1 mmol/L Final    Chloride 09/13/2022 105  101 - 110 mmol/L Final    CO2 09/13/2022 26  21 - 32 mmol/L Final    Anion Gap 09/13/2022 7  4 - 13 mmol/L Final    Glucose 09/13/2022 104 (A) 65 - 100 mg/dL Final    BUN 09/13/2022 17  8 - 23 MG/DL Final    Creatinine 09/13/2022 0.70  0.6 - 1.0 MG/DL Final    GFR  09/13/2022 >60  >60 ml/min/1.73m2 Final    GFR Non- 09/13/2022 >60  >60 ml/min/1.73m2 Final    Comment:   Estimated GFR is calculated using the Modification of Diet in Renal Disease (MDRD) Study equation, reported for both  Americans (GFRAA) and non- Americans (GFRNA), and normalized to 1.73m2 body surface area.  The physician must decide which value applies to the patient. The MDRD study equation should only be used in individuals age 25 or older. It has not been validated for the following: pregnant women, patients with serious comorbid conditions,or on certain medications, or persons with extremes of body size, muscle mass, or nutritional status. Calcium 09/13/2022 9.8  8.3 - 10.4 MG/DL Final    Total Bilirubin 09/13/2022 0.6  0.2 - 1.1 MG/DL Final    ALT 09/13/2022 37  12 - 65 U/L Final    AST 09/13/2022 36  15 - 37 U/L Final    Alk Phosphatase 09/13/2022 82  50 - 136 U/L Final    Total Protein 09/13/2022 7.1  6.3 - 8.2 g/dL Final    Albumin 09/13/2022 4.1  3.2 - 4.6 g/dL Final    Globulin 09/13/2022 3.0  2.3 - 3.5 g/dL Final    Albumin/Globulin Ratio 09/13/2022 1.4  1.2 - 3.5   Final         Assessent & Plan:        1. Medicare annual wellness visit, subsequent  Overview:  9/20/22    2. Diabetic polyneuropathy associated with type 2 diabetes mellitus (HCC)  Overview:  The patient putatively has had chronic severe diabetic neuropathy with bilateral foot drop with slowly progressive symptoms. Currently using wheeled walker / bilateral AFO splints / wheelchair PRN. Partial improvement with gabapentin at bedtime. Increase to BID. The patient was advised to call for reevaluation should the symptoms fail to improve with treatment. Orders:  -     gabapentin (NEURONTIN) 300 MG capsule; Take 1 capsule by mouth 2 times daily for 360 days. Intended supply: 30 days, Disp-180 capsule, R-3Normal  3. Age-related osteoporosis without current pathological fracture  Overview:  3/9/21 25 vitamin D 46.8.    3/5/21 DEXA scan. 4/19/2019:  LS spine films revealed mild anterior compression deformity of T12 vertebra    The patient will continue the current treatment with Fosamax, calcium, and vitamin D therapy. Orders:  -     alendronate (FOSAMAX) 35 MG tablet; Take 1 tablet by mouth every 7 days, Disp-12 tablet, R-1Normal  4.  Encounter for immunization  Overview:  Vaccinations reviewed / discussed. She declines Shingrix and Covid 19 vaccination. Recommended annual flu shot. Pneumococcal and Tdap up to date. 5. Primary hypertension  Overview:  Well controlled on losartan 100 mg daily. The patient will continue the current treatment. Orders:  -     losartan (COZAAR) 100 MG tablet; Take 1 tablet by mouth daily, Disp-90 tablet, R-1Normal  -     Comprehensive Metabolic Panel; Future  6. Mixed hyperlipidemia  Overview:  Reviewed the most recent lipid panel with the patient, and interpreted the results within the context of the patient's personal cardiovascular risk factors. Discussed/reinforced a low-cholesterol diet. The patient will continue current intensity statin therapy rosuvastatin 20 mg daily. Orders:  -     rosuvastatin (CRESTOR) 20 MG tablet; Take 1 tablet by mouth daily, Disp-90 tablet, R-1Normal  -     Lipid Panel; Future  7. Overactive bladder  Overview:  Evaluated by urogynecologist Dr. Shannan Burton. 12/5/2018 post void residual 65 cc. Symptoms improved on Myrbetriq. The patient will continue the current treatment. Orders:  -     mirabegron (MYRBETRIQ) 50 MG TB24; Take 50 mg by mouth daily, Disp-90 tablet, R-1Normal  8. Type 2 diabetes mellitus with microalbuminuria, without long-term current use of insulin (HCC)  Overview:  9/13/22 , A1C 5.9, urine not submitted. 9/8/21 , A1C 6.3, urine microalbumin 307  3/9/21 , A1C 6.4, urine microalbumin 353    I discussed diabetes mellitus with the patient and provided counseling. We reviewed signs and symptoms of hypo and hyperglycemia. The patient was instructed regarding management strategies. We discussed the need for good glycemic control to avoid long-term complications, and the need for yearly diabetic eye and foot examinations. The patient will continue the current treatment.     With respect to her microalbuminuria, she is on max dose losartan. Her BP is well controlled. Will monitor. Orders:  -     metFORMIN (GLUCOPHAGE) 1000 MG tablet; Take 1 tablet by mouth 2 times daily, Disp-180 tablet, R-1Normal  -     Hemoglobin A1C; Future  9. Restless leg syndrome  Overview:  3/9/21 ferritin 50. Symptoms improved with gabapentin. The patient will continue the current treatment. 10. Colon cancer screening  Overview:  Discussed colon cancer screening. Last colonoscopy 9/17/14 normal.  Repeat 10 years. The patient and/or patient representative voiced understanding and agreement with the current diagnoses, recommendations, and possible side effects. Return in about 6 months (around 3/20/2023) for follow up of chronic medical problems, review labs. Medicare Annual Wellness Visit    Carlos Parrish is here for Follow-up Chronic Condition (Pt reports could not give Urine prior;  submitted sample today) and Medicare AWV    Assessment & Plan   Medicare annual wellness visit, subsequent  Diabetic polyneuropathy associated with type 2 diabetes mellitus ([facility] [address]  -     gabapentin (NEURONTIN) 300 MG capsule; Take 1 capsule by mouth 2 times daily for 360 days. Intended supply: 30 days, Disp-180 capsule, R-3Normal  Age-related osteoporosis without current pathological fracture  -     alendronate (FOSAMAX) 35 MG tablet; Take 1 tablet by mouth every 7 days, Disp-12 tablet, R-1Normal  Encounter for immunization  Primary hypertension  -     losartan (COZAAR) 100 MG tablet; Take 1 tablet by mouth daily, Disp-90 tablet, R-1Normal  -     Comprehensive Metabolic Panel; Future  Mixed hyperlipidemia  -     rosuvastatin (CRESTOR) 20 MG tablet; Take 1 tablet by mouth daily, Disp-90 tablet, R-1Normal  -     Lipid Panel;  Future  Overactive bladder  -     mirabegron (MYRBETRIQ) 50 MG TB24; Take 50 mg by mouth daily, Disp-90 tablet, R-1Normal  Type 2 diabetes mellitus with microalbuminuria, without long-term current use of insulin (HCC)  - metFORMIN (GLUCOPHAGE) 1000 MG tablet; Take 1 tablet by mouth 2 times daily, Disp-180 tablet, R-1Normal  -     Hemoglobin A1C; Future  Restless leg syndrome  Colon cancer screening    Recommendations for Preventive Services Due: see orders and patient instructions/AVS.  Recommended screening schedule for the next 5-10 years is provided to the patient in written form: see Patient Instructions/AVS.     Return in about 6 months (around 3/20/2023) for follow up of chronic medical problems, review labs. Subjective       Patient's complete Health Risk Assessment and screening values have been reviewed and are found in Flowsheets. The following problems were reviewed today and where indicated follow up appointments were made and/or referrals ordered. Positive Risk Factor Screenings with Interventions:             General Health and ACP:  General  In general, how would you say your health is?: Good  In the past 7 days, have you experienced any of the following: New or Increased Pain, New or Increased Fatigue, Loneliness, Social Isolation, Stress or Anger?: No  Do you get the social and emotional support that you need?: Yes  Do you have a Living Will?: (!) No    Advance Directives       Power of  Living Will ACP-Advance Directive ACP-Power of     Not on File Filed on 12/29/15 Filed Not on File        General Health Risk Interventions:  No Living Will: [address] addressed with patient today              Objective   Vitals:    09/20/22 0954   BP: 125/69   Site: Left Upper Arm   Position: Sitting   Pulse: (!) 114   Temp: 98.6 °F (37 °C)   TempSrc: Temporal   SpO2: 98%   Weight: 119 lb (54 kg)   Height: 5' 1\" (1.549 m)      Body mass index is 22.48 kg/m². Allergies   Allergen Reactions    Sulfa Antibiotics Other (See Comments)    Sulfamethoxazole-Trimethoprim Other (See Comments)     Prior to Visit Medications    Medication Sig Taking?  Authorizing Provider   gabapentin (NEURONTIN) 300 MG capsule Take 1 capsule by mouth 2 times daily for 360 days.  Intended supply: 30 days Yes Ta Gray MD   alendronate (FOSAMAX) 35 MG tablet Take 1 tablet by mouth every 7 days Yes Ta Gray MD   losartan (COZAAR) 100 MG tablet Take 1 tablet by mouth daily Yes Ta Gray MD   metFORMIN (GLUCOPHAGE) 1000 MG tablet Take 1 tablet by mouth 2 times daily Yes Ta Gray MD   mirabegron (MYRBETRIQ) 50 MG TB24 Take 50 mg by mouth daily Yes Ta Gray MD   rosuvastatin (CRESTOR) 20 MG tablet Take 1 tablet by mouth daily Yes Ta Gray MD   aspirin 81 MG chewable tablet Take 81 mg by mouth daily Yes Ar Automatic Reconciliation       CareTeam (Including outside providers/suppliers regularly involved in providing care):   Patient Care Team:  Ta Gray MD as PCP - General  Ta Gray MD as PCP - [facility] Empaneled Provider     Reviewed and updated this visit:  Tobacco  Meds  Med Hx  Surg Hx  Soc Hx  Fam Hx

## 2022-10-20 PROBLEM — Z00.00 MEDICARE ANNUAL WELLNESS VISIT, SUBSEQUENT: Status: RESOLVED | Noted: 2021-03-16 | Resolved: 2022-10-20

## 2022-10-20 PROBLEM — Z12.39 BREAST CANCER SCREENING: Status: RESOLVED | Noted: 2020-12-15 | Resolved: 2022-10-20

## 2022-12-19 DIAGNOSIS — E11.42 DIABETIC POLYNEUROPATHY ASSOCIATED WITH TYPE 2 DIABETES MELLITUS (HCC): ICD-10-CM

## 2022-12-19 RX ORDER — GABAPENTIN 300 MG/1
300 CAPSULE ORAL NIGHTLY
Qty: 90 CAPSULE | Refills: 1 | Status: SHIPPED | OUTPATIENT
Start: 2022-12-19 | End: 2023-06-17

## 2022-12-19 NOTE — TELEPHONE ENCOUNTER
Requested Prescriptions     Pending Prescriptions Disp Refills    gabapentin (NEURONTIN) 300 MG capsule [Pharmacy Med Name: GABAPENTIN 300MG CAPSULES] 90 capsule 1     Sig: Take 1 capsule by mouth at bedtime for 180 days.

## 2022-12-21 ENCOUNTER — OFFICE VISIT (OUTPATIENT)
Dept: INTERNAL MEDICINE CLINIC | Facility: CLINIC | Age: 67
End: 2022-12-21
Payer: COMMERCIAL

## 2022-12-21 ENCOUNTER — HOSPITAL ENCOUNTER (OUTPATIENT)
Dept: GENERAL RADIOLOGY | Age: 67
Discharge: HOME | End: 2022-12-23
Payer: COMMERCIAL

## 2022-12-21 VITALS
DIASTOLIC BLOOD PRESSURE: 74 MMHG | HEIGHT: 61 IN | SYSTOLIC BLOOD PRESSURE: 140 MMHG | BODY MASS INDEX: 22.48 KG/M2 | OXYGEN SATURATION: 98 % | HEART RATE: 111 BPM

## 2022-12-21 DIAGNOSIS — M54.50 ACUTE MIDLINE LOW BACK PAIN WITHOUT SCIATICA: Primary | ICD-10-CM

## 2022-12-21 DIAGNOSIS — M54.50 ACUTE MIDLINE LOW BACK PAIN WITHOUT SCIATICA: ICD-10-CM

## 2022-12-21 PROCEDURE — 3074F SYST BP LT 130 MM HG: CPT | Performed by: INTERNAL MEDICINE

## 2022-12-21 PROCEDURE — 72100 X-RAY EXAM L-S SPINE 2/3 VWS: CPT

## 2022-12-21 PROCEDURE — 3078F DIAST BP <80 MM HG: CPT | Performed by: INTERNAL MEDICINE

## 2022-12-21 PROCEDURE — 99213 OFFICE O/P EST LOW 20 MIN: CPT | Performed by: INTERNAL MEDICINE

## 2022-12-21 PROCEDURE — 1123F ACP DISCUSS/DSCN MKR DOCD: CPT | Performed by: INTERNAL MEDICINE

## 2022-12-21 RX ORDER — TIZANIDINE 2 MG/1
2 TABLET ORAL 3 TIMES DAILY PRN
Qty: 30 TABLET | Refills: 0 | Status: SHIPPED | OUTPATIENT
Start: 2022-12-21

## 2022-12-21 ASSESSMENT — ENCOUNTER SYMPTOMS
RECTAL PAIN: 0
EYE PAIN: 0
STRIDOR: 0
VOICE CHANGE: 0

## 2022-12-21 NOTE — PROGRESS NOTES
FOLLOWUP VISIT    Subjective:    Ms. Benjamín Camarena is a 75 y.o., female,   Chief Complaint   Patient presents with    Lower Back Pain     Hurts from mid lower back on right side down to \"tailbone\". HPI:    Approximately 2 to 3 weeks ago the patient awoke in the early morning hours with the need to urinate. She has a bedside commode but in the dark she missed the altagracia and sat down forcefully onto the floor right on her tailbone. Since then she has had low back pain which has been in the lower lumbar midline to slightly right of midline. No radiation of pain or sciatica type symptoms. No hip or groin pain. The pain currently is better than it was 2 weeks ago but just seems to be taking a long time to completely improve and therefore she presents today for evaluation. The following portions of the patient's history were reviewed and updated as appropriate:      Past Medical History:   Diagnosis Date    Bilateral foot-drop     Due to diabetic polyneuropathy. AFO braces.       Cataract     Chronic back pain 9/8/2014    Diabetic neuropathy ([address]     Hypertension     Mixed hyperlipidemia 6/12/2015    Need for hepatitis C screening test 12/15/2020    9/27/16 HCV antibody negative    Onychomycosis 12/16/2016    Osteoporosis 06/12/2015    Overactive bladder 6/12/2015    RLS (restless legs syndrome)     Type 2 diabetes mellitus ([address] 9/8/2014    Ulcer of toe, chronic ([address] 12/16/2016       Past Surgical History:   Procedure Laterality Date    CHOLECYSTECTOMY, OPEN  1990    COLONOSCOPY  2009    MOHS SURGERY Right 2005    ORTHOPEDIC SURGERY Left 10/2012    fx ankle with pins    ORTHOPEDIC SURGERY  6/2013    hammertoe surgery    ORTHOPEDIC SURGERY Left 12/2012    ankle scottie       Family History   Problem Relation Age of Onset    Heart Disease Mother     Other Brother         heart problems    Breast Cancer Maternal Aunt     Ovarian Cancer Neg Hx     Colon Cancer Neg Hx     Uterine Cancer Neg Hx        Social History Socioeconomic History    Marital status:      Spouse name: Not on file    Number of children: Not on file    Years of education: Not on file    Highest education level: Not on file   Occupational History    Not on file   Tobacco Use    Smoking status: Never     Passive exposure: Yes    Smokeless tobacco: Never   Substance and Sexual Activity    Alcohol use: No     Alcohol/week: 0.0 standard drinks    Drug use: No    Sexual activity: Not on file   Other Topics Concern    Not on file   Social History Narrative    Lives at home with her nephew     Social Determinants of Health     Financial Resource Strain: Not on file   Food Insecurity: Not on file   Transportation Needs: Not on file   Physical Activity: Unknown    Days of Exercise per Week: 0 days    Minutes of Exercise per Session: Not on file   Stress: Not on file   Social Connections: Not on file   Intimate Partner Violence: Not on file   Housing Stability: Not on file       Current Outpatient Medications   Medication Sig Dispense Refill                  gabapentin (NEURONTIN) 300 MG capsule Take 1 capsule by mouth at bedtime for 180 days. 90 capsule 1    alendronate (FOSAMAX) 35 MG tablet Take 1 tablet by mouth every 7 days 12 tablet 1    losartan (COZAAR) 100 MG tablet Take 1 tablet by mouth daily 90 tablet 1    metFORMIN (GLUCOPHAGE) 1000 MG tablet Take 1 tablet by mouth 2 times daily 180 tablet 1    mirabegron (MYRBETRIQ) 50 MG TB24 Take 50 mg by mouth daily 90 tablet 1    rosuvastatin (CRESTOR) 20 MG tablet Take 1 tablet by mouth daily 90 tablet 1    aspirin 81 MG chewable tablet Take 81 mg by mouth daily       No current facility-administered medications for this visit.        Allergies as of 12/21/2022 - Fully Reviewed 12/21/2022   Allergen Reaction Noted    Sulfa antibiotics Other (See Comments) 12/16/2016    Sulfamethoxazole-trimethoprim Other (See Comments) 05/31/2020       Review of Systems   Constitutional:  Negative for activity change and appetite change. HENT:  Negative for drooling and voice change. Eyes:  Negative for pain. Respiratory:  Negative for stridor. Gastrointestinal:  Negative for rectal pain. Endocrine: Negative for polydipsia and polyphagia. Genitourinary:  Negative for dyspareunia and enuresis. Musculoskeletal:  Negative for gait problem and neck stiffness. Skin:  Negative for pallor. Neurological:  Negative for facial asymmetry and speech difficulty. Hematological:  Does not bruise/bleed easily. Psychiatric/Behavioral:  Negative for self-injury. The patient is not hyperactive. Patient Care Team:  Tom Ayala MD as PCP - General  Tom Ayala MD as PCP - [facility] Empaneled Provider    Objective:    BP (!) 140/74 (Site: Right Upper Arm, Position: Sitting)   Pulse (!) 111   Ht 5' 1\" (1.549 m)   SpO2 98%   BMI 22.48 kg/m²     Physical Exam  Constitutional:       General: She is not in acute distress. Appearance: She is not toxic-appearing. HENT:      Head: Normocephalic and atraumatic. Nose: Nose normal.   Eyes:      Extraocular Movements: Extraocular movements intact. Conjunctiva/sclera: Conjunctivae normal.   Pulmonary:      Effort: Pulmonary effort is normal. No respiratory distress. Breath sounds: No stridor. Musculoskeletal:      Comments: The patient has no bony lumbar vertebral tenderness. She seems to be primarily tender with palpation of the coccyx. Also some tenderness with palpation of the lower lumbar muscles to the right of midline. Skin:     Coloration: Skin is not jaundiced or pale. Neurological:      Mental Status: She is alert and oriented to person, place, and time. Psychiatric:         Thought Content:  Thought content normal.            Orders Only on 09/13/2022   Component Date Value Ref Range Status    Microalbumin, Random Urine 09/20/2022 9.60 (A)  <2.0 MG/DL Final    Creatinine, Ur 09/20/2022 15.00  mg/dL Final    Microalbumin Creatinine Ratio 09/20/2022 640  mg/g Final         Assessent & Plan:        1. Acute midline low back pain without sciatica  -     XR LUMBAR SPINE (2-3 VIEWS); Future  -     tiZANidine (ZANAFLEX) 2 MG tablet; Take 1 tablet by mouth 3 times daily as needed (low back pain / muscle spasm), Disp-30 tablet, R-0Normal  -     diclofenac (VOLTAREN) 50 MG EC tablet; Take 1 tablet by mouth 2 times daily (with meals), Disp-60 tablet, R-0Normal    Will check LS spine films. Her renal function is normal.  We will treat empirically with diclofenac 50 mg p.o. twice daily with meals and tizanidine as needed muscle spasm. Also warm heat as needed. Her pain is improving slowly and may take another few weeks to completely resolve. The patient and/or patient representative voiced understanding and agreement with the current diagnoses, recommendations, and possible side effects. Return if symptoms worsen or fail to improve.

## 2023-03-08 DIAGNOSIS — I10 PRIMARY HYPERTENSION: ICD-10-CM

## 2023-03-08 DIAGNOSIS — R80.9 TYPE 2 DIABETES MELLITUS WITH MICROALBUMINURIA, WITHOUT LONG-TERM CURRENT USE OF INSULIN (HCC): ICD-10-CM

## 2023-03-08 DIAGNOSIS — E78.2 MIXED HYPERLIPIDEMIA: ICD-10-CM

## 2023-03-08 DIAGNOSIS — E11.29 TYPE 2 DIABETES MELLITUS WITH MICROALBUMINURIA, WITHOUT LONG-TERM CURRENT USE OF INSULIN (HCC): ICD-10-CM

## 2023-03-08 LAB
ALBUMIN SERPL-MCNC: 3.4 G/DL (ref 3.2–4.6)
ALBUMIN/GLOB SERPL: 1.2 (ref 0.4–1.6)
ALP SERPL-CCNC: 73 U/L (ref 50–136)
ALT SERPL-CCNC: 25 U/L (ref 12–65)
ANION GAP SERPL CALC-SCNC: 5 MMOL/L (ref 2–11)
AST SERPL-CCNC: 23 U/L (ref 15–37)
BILIRUB SERPL-MCNC: 0.5 MG/DL (ref 0.2–1.1)
BUN SERPL-MCNC: 17 MG/DL (ref 8–23)
CALCIUM SERPL-MCNC: 8.9 MG/DL (ref 8.3–10.4)
CHLORIDE SERPL-SCNC: 112 MMOL/L (ref 101–110)
CHOLEST SERPL-MCNC: 115 MG/DL
CO2 SERPL-SCNC: 26 MMOL/L (ref 21–32)
CREAT SERPL-MCNC: 0.7 MG/DL (ref 0.6–1)
EST. AVERAGE GLUCOSE BLD GHB EST-MCNC: 117 MG/DL
GLOBULIN SER CALC-MCNC: 2.9 G/DL (ref 2.8–4.5)
GLUCOSE SERPL-MCNC: 122 MG/DL (ref 65–100)
HBA1C MFR BLD: 5.7 % (ref 4.8–5.6)
HDLC SERPL-MCNC: 46 MG/DL (ref 40–60)
HDLC SERPL: 2.5
LDLC SERPL CALC-MCNC: 39.8 MG/DL
POTASSIUM SERPL-SCNC: 4.6 MMOL/L (ref 3.5–5.1)
PROT SERPL-MCNC: 6.3 G/DL (ref 6.3–8.2)
SODIUM SERPL-SCNC: 143 MMOL/L (ref 133–143)
TRIGL SERPL-MCNC: 146 MG/DL (ref 35–150)
VLDLC SERPL CALC-MCNC: 29.2 MG/DL (ref 6–23)

## 2023-03-15 ENCOUNTER — OFFICE VISIT (OUTPATIENT)
Dept: INTERNAL MEDICINE CLINIC | Facility: CLINIC | Age: 68
End: 2023-03-15
Payer: COMMERCIAL

## 2023-03-15 VITALS
RESPIRATION RATE: 16 BRPM | SYSTOLIC BLOOD PRESSURE: 110 MMHG | WEIGHT: 116.2 LBS | TEMPERATURE: 97 F | OXYGEN SATURATION: 97 % | BODY MASS INDEX: 21.94 KG/M2 | HEIGHT: 61 IN | HEART RATE: 98 BPM | DIASTOLIC BLOOD PRESSURE: 74 MMHG

## 2023-03-15 DIAGNOSIS — G25.81 RESTLESS LEG SYNDROME: ICD-10-CM

## 2023-03-15 DIAGNOSIS — M21.372 BILATERAL FOOT-DROP: Primary | ICD-10-CM

## 2023-03-15 DIAGNOSIS — E11.42 DIABETIC POLYNEUROPATHY ASSOCIATED WITH TYPE 2 DIABETES MELLITUS (HCC): ICD-10-CM

## 2023-03-15 DIAGNOSIS — E11.59 HYPERTENSION ASSOCIATED WITH TYPE 2 DIABETES MELLITUS (HCC): ICD-10-CM

## 2023-03-15 DIAGNOSIS — R80.9 TYPE 2 DIABETES MELLITUS WITH MICROALBUMINURIA, WITHOUT LONG-TERM CURRENT USE OF INSULIN (HCC): ICD-10-CM

## 2023-03-15 DIAGNOSIS — E78.5 HYPERLIPIDEMIA ASSOCIATED WITH TYPE 2 DIABETES MELLITUS (HCC): ICD-10-CM

## 2023-03-15 DIAGNOSIS — M81.0 AGE-RELATED OSTEOPOROSIS WITHOUT CURRENT PATHOLOGICAL FRACTURE: ICD-10-CM

## 2023-03-15 DIAGNOSIS — E78.2 MIXED HYPERLIPIDEMIA: ICD-10-CM

## 2023-03-15 DIAGNOSIS — I10 PRIMARY HYPERTENSION: ICD-10-CM

## 2023-03-15 DIAGNOSIS — M21.371 BILATERAL FOOT-DROP: Primary | ICD-10-CM

## 2023-03-15 DIAGNOSIS — I15.2 HYPERTENSION ASSOCIATED WITH TYPE 2 DIABETES MELLITUS (HCC): ICD-10-CM

## 2023-03-15 DIAGNOSIS — K21.9 GASTROESOPHAGEAL REFLUX DISEASE, UNSPECIFIED WHETHER ESOPHAGITIS PRESENT: ICD-10-CM

## 2023-03-15 DIAGNOSIS — N32.81 OVERACTIVE BLADDER: ICD-10-CM

## 2023-03-15 DIAGNOSIS — E11.29 TYPE 2 DIABETES MELLITUS WITH MICROALBUMINURIA, WITHOUT LONG-TERM CURRENT USE OF INSULIN (HCC): ICD-10-CM

## 2023-03-15 DIAGNOSIS — E11.69 HYPERLIPIDEMIA ASSOCIATED WITH TYPE 2 DIABETES MELLITUS (HCC): ICD-10-CM

## 2023-03-15 PROCEDURE — 3074F SYST BP LT 130 MM HG: CPT | Performed by: INTERNAL MEDICINE

## 2023-03-15 PROCEDURE — 3044F HG A1C LEVEL LT 7.0%: CPT | Performed by: INTERNAL MEDICINE

## 2023-03-15 PROCEDURE — 1123F ACP DISCUSS/DSCN MKR DOCD: CPT | Performed by: INTERNAL MEDICINE

## 2023-03-15 PROCEDURE — 3078F DIAST BP <80 MM HG: CPT | Performed by: INTERNAL MEDICINE

## 2023-03-15 PROCEDURE — 99214 OFFICE O/P EST MOD 30 MIN: CPT | Performed by: INTERNAL MEDICINE

## 2023-03-15 RX ORDER — LOSARTAN POTASSIUM 100 MG/1
100 TABLET ORAL DAILY
Qty: 90 TABLET | Refills: 3 | Status: SHIPPED | OUTPATIENT
Start: 2023-03-15

## 2023-03-15 RX ORDER — ALENDRONATE SODIUM 35 MG/1
35 TABLET ORAL
Qty: 12 TABLET | Refills: 3 | Status: SHIPPED | OUTPATIENT
Start: 2023-03-15

## 2023-03-15 RX ORDER — GABAPENTIN 300 MG/1
300 CAPSULE ORAL 2 TIMES DAILY
Qty: 180 CAPSULE | Refills: 3 | Status: SHIPPED | OUTPATIENT
Start: 2023-03-15 | End: 2023-09-11

## 2023-03-15 RX ORDER — ROSUVASTATIN CALCIUM 20 MG/1
20 TABLET, COATED ORAL DAILY
Qty: 90 TABLET | Refills: 3 | Status: SHIPPED | OUTPATIENT
Start: 2023-03-15

## 2023-03-15 SDOH — ECONOMIC STABILITY: FOOD INSECURITY: WITHIN THE PAST 12 MONTHS, YOU WORRIED THAT YOUR FOOD WOULD RUN OUT BEFORE YOU GOT MONEY TO BUY MORE.: NEVER TRUE

## 2023-03-15 SDOH — ECONOMIC STABILITY: FOOD INSECURITY: WITHIN THE PAST 12 MONTHS, THE FOOD YOU BOUGHT JUST DIDN'T LAST AND YOU DIDN'T HAVE MONEY TO GET MORE.: NEVER TRUE

## 2023-03-15 SDOH — ECONOMIC STABILITY: INCOME INSECURITY: HOW HARD IS IT FOR YOU TO PAY FOR THE VERY BASICS LIKE FOOD, HOUSING, MEDICAL CARE, AND HEATING?: NOT HARD AT ALL

## 2023-03-15 SDOH — ECONOMIC STABILITY: HOUSING INSECURITY
IN THE LAST 12 MONTHS, WAS THERE A TIME WHEN YOU DID NOT HAVE A STEADY PLACE TO SLEEP OR SLEPT IN A SHELTER (INCLUDING NOW)?: NO

## 2023-03-15 ASSESSMENT — ENCOUNTER SYMPTOMS
EYE PAIN: 0
VOICE CHANGE: 0
RECTAL PAIN: 0
STRIDOR: 0

## 2023-03-15 ASSESSMENT — PATIENT HEALTH QUESTIONNAIRE - PHQ9
SUM OF ALL RESPONSES TO PHQ QUESTIONS 1-9: 0
SUM OF ALL RESPONSES TO PHQ9 QUESTIONS 1 & 2: 0
1. LITTLE INTEREST OR PLEASURE IN DOING THINGS: 0
2. FEELING DOWN, DEPRESSED OR HOPELESS: 0
SUM OF ALL RESPONSES TO PHQ QUESTIONS 1-9: 0

## 2023-03-15 NOTE — PROGRESS NOTES
FOLLOWUP VISIT    Subjective:    Ms. Karie Pa is a 70 y.o., female,   Chief Complaint   Patient presents with    6 Month Follow-Up    Gastroesophageal Reflux       HPI:    Patient presents today for follow up of two or more chronic medical problems and review of labs. The patient has diabetes mellitus. The patient denies any symptoms of hypo or hyperglycemia. The patient has been attempting to be compliant with an ADA diet and an exercise program.     The patient has hypertension. The patient has been on an attempted low sodium diet and has been trying to exercise and maintain a healthy weight. The patient reports good compliance with the blood pressure medications. The patient has hyperlipidemia. The patient has been following a low cholesterol diet and denies any myalgias or weakness on current lipid lowering therapy. Also complains of GERD daily each morning after drinking several cups of coffee. The following portions of the patient's history were reviewed and updated as appropriate:      Past Medical History:   Diagnosis Date    Bilateral foot-drop     Due to diabetic polyneuropathy. AFO braces.       Cataract     Chronic back pain 9/8/2014    Diabetic neuropathy ([address]     Hypertension     Mixed hyperlipidemia 6/12/2015    Need for hepatitis C screening test 12/15/2020    9/27/16 HCV antibody negative    Onychomycosis 12/16/2016    Osteoporosis 06/12/2015    Overactive bladder 6/12/2015    RLS (restless legs syndrome)     Type 2 diabetes mellitus ([address] 9/8/2014    Ulcer of toe, chronic ([address] 12/16/2016       Past Surgical History:   Procedure Laterality Date    CHOLECYSTECTOMY, OPEN  1990    COLONOSCOPY  2009    MOHS SURGERY Right 2005    ORTHOPEDIC SURGERY Left 10/2012    fx ankle with pins    ORTHOPEDIC SURGERY  6/2013    [facility] surgery    ORTHOPEDIC SURGERY Left 12/2012    ankle scottie       Family History   Problem Relation Age of Onset    Heart Disease Mother     Other Brother heart problems    Breast Cancer Maternal Aunt     Ovarian Cancer Neg Hx     Colon Cancer Neg Hx     Uterine Cancer Neg Hx        Social History     Socioeconomic History    Marital status:      Spouse name: Not on file    Number of children: Not on file    Years of education: Not on file    Highest education level: Not on file   Occupational History    Not on file   Tobacco Use    Smoking status: Never     Passive exposure: Yes    Smokeless tobacco: Never   Substance and Sexual Activity    Alcohol use: No     Alcohol/week: 0.0 standard drinks    Drug use: No    Sexual activity: Not on file   Other Topics Concern    Not on file   Social History Narrative    Lives at home with her nephew     Social Determinants of Health     Financial Resource Strain: Low Risk     Difficulty of Paying Living Expenses: Not hard at all   Food Insecurity: No Food Insecurity    Worried About [address] in the Last Year: Never true    [address] in the Last Year: Never true   Transportation Needs: Unknown    Lack of Transportation (Medical): Not on file    Lack of Transportation (Non-Medical): No   Physical Activity: Unknown    Days of Exercise per Week: 0 days    Minutes of Exercise per Session: Not on file   Stress: Not on file   Social Connections: Not on file   Intimate Partner Violence: Not on file   Housing Stability: Unknown    Unable to Pay for Housing in the Last Year: Not on file    [facility] in the [facility]: Not on file    Unstable Housing in the Last Year: No       Current Outpatient Medications   Medication Sig Dispense Refill    alendronate (FOSAMAX) 35 MG tablet Take 1 tablet by mouth every 7 days 12 tablet 3    gabapentin (NEURONTIN) 300 MG capsule Take 1 capsule by mouth in the morning and at bedtime for 180 days.  180 capsule 3    losartan (COZAAR) 100 MG tablet Take 1 tablet by mouth daily 90 tablet 3    metFORMIN (GLUCOPHAGE) 1000 MG tablet Take 1 tablet by mouth 2 times daily 180 tablet 3    mirabegron (MYRBETRIQ) 50 MG TB24 Take 50 mg by mouth daily 90 tablet 3    rosuvastatin (CRESTOR) 20 MG tablet Take 1 tablet by mouth daily 90 tablet 3    tiZANidine (ZANAFLEX) 2 MG tablet Take 1 tablet by mouth 3 times daily as needed (low back pain / muscle spasm) 30 tablet 0    aspirin 81 MG chewable tablet Take 81 mg by mouth daily       No current facility-administered medications for this visit. Allergies as of 03/15/2023 - Fully Reviewed 03/15/2023   Allergen Reaction Noted    Sulfa antibiotics Other (See Comments) 12/16/2016    Sulfamethoxazole-trimethoprim Other (See Comments) 05/31/2020       Review of Systems   Constitutional:  Negative for activity change and appetite change. HENT:  Negative for drooling and voice change. Eyes:  Negative for pain. Respiratory:  Negative for stridor. Gastrointestinal:  Negative for rectal pain. Endocrine: Negative for polydipsia and polyphagia. Genitourinary:  Negative for dyspareunia and enuresis. Musculoskeletal:  Negative for gait problem and neck stiffness. Skin:  Negative for pallor. Neurological:  Negative for facial asymmetry and speech difficulty. Hematological:  Does not bruise/bleed easily. Psychiatric/Behavioral:  Negative for self-injury. The patient is not hyperactive. Patient Care Team:  Heriberto Mcclain MD as PCP - General  Heriberto Mcclain MD as PCP - Empaneled Provider    Objective:    /74 (Site: Left Upper Arm, Position: Sitting)   Pulse 98   Temp 97 °F (36.1 °C) (Temporal)   Resp 16   Ht 5' 1\" (1.549 m)   Wt 116 lb 3.2 oz (52.7 kg)   SpO2 97%   BMI 21.96 kg/m²     Physical Exam  Vitals reviewed. Constitutional:       General: She is not in acute distress. Appearance: She is not toxic-appearing. HENT:      Head: Normocephalic and atraumatic.       Right Ear: Tympanic membrane, ear canal and external ear normal.      Left Ear: Tympanic membrane, ear canal and external ear normal.      Nose: Nose normal.      Mouth/Throat:      Mouth: Mucous membranes are moist.      Pharynx: Oropharynx is clear. Eyes:      General: No scleral icterus. Extraocular Movements: Extraocular movements intact. Pupils: Pupils are equal, round, and reactive to light. Cardiovascular:      Rate and Rhythm: Normal rate and regular rhythm. Pulses: Normal pulses. Heart sounds: Normal heart sounds. Pulmonary:      Effort: Pulmonary effort is normal. No respiratory distress. Breath sounds: Normal breath sounds. No stridor. Abdominal:      General: Abdomen is flat. Bowel sounds are normal.      Palpations: Abdomen is soft. There is no mass. Tenderness: There is no guarding or rebound. Musculoskeletal:         General: Normal range of motion. Cervical back: Normal range of motion and neck supple. Skin:     General: Skin is warm and dry. Coloration: Skin is not jaundiced. Neurological:      Mental Status: She is alert and oriented to person, place, and time. Mental status is at baseline. Comments: Bilateral foot drop present. AFO braces removed. Diabetic foot exam:   Left Foot:   Visual Exam: normal   Pulse DP: 2+ (normal)   Filament test: reduced sensation     Right Foot:   Visual Exam: normal   Pulse DP: 2+ (normal)   Filament test: reduced sensation       Psychiatric:         Behavior: Behavior normal.         Thought Content:  Thought content normal.            Orders Only on 03/08/2023   Component Date Value Ref Range Status    Cholesterol, Total 03/08/2023 115  <200 MG/DL Final    Comment: Borderline High: 200-239 mg/dL  High: Greater than or equal to 240 mg/dL      Triglycerides 03/08/2023 146  35 - 150 MG/DL Final    Comment: Borderline High: 150-199 mg/dL, High: 200-499 mg/dL  Very High: Greater than or equal to 500 mg/dL      HDL 03/08/2023 46  40 - 60 MG/DL Final    LDL Calculated 03/08/2023 39.8  <100 MG/DL Final    Comment: Near Optimal: 100-129 mg/dL  Borderline High: 130-159, High: 160-189 mg/dL  Very High: Greater than or equal to 190 mg/dL      VLDL Cholesterol Calculated 03/08/2023 29.2 (A)  6.0 - 23.0 MG/DL Final    Chol/HDL Ratio 03/08/2023 2.5    Final    Hemoglobin A1C 03/08/2023 5.7 (A)  4.8 - 5.6 % Final    eAG 03/08/2023 117  mg/dL Final    Comment: Reference Range  Normal: 4.8-5.6  Diabetic >=6.5%  Normal       <5.7%      Sodium 03/08/2023 143  133 - 143 mmol/L Final    Potassium 03/08/2023 4.6  3.5 - 5.1 mmol/L Final    Chloride 03/08/2023 112 (A)  101 - 110 mmol/L Final    CO2 03/08/2023 26  21 - 32 mmol/L Final    Anion Gap 03/08/2023 5  2 - 11 mmol/L Final    Glucose 03/08/2023 122 (A)  65 - 100 mg/dL Final    BUN 03/08/2023 17  8 - 23 MG/DL Final    Creatinine 03/08/2023 0.70  0.6 - 1.0 MG/DL Final    Est, Glom Filt Rate 03/08/2023 >60  >60 ml/min/1.73m2 Final    Comment:   Pediatric calculator link: [employer]. org/professionals/kdoqi/gfr_calculatorped    These results are not intended for use in patients <25years of age. eGFR results are calculated without a race factor using  the 2021 CKD-EPI equation. Careful clinical correlation is recommended, particularly when comparing to results calculated using previous equations. The CKD-EPI equation is less accurate in patients with extremes of muscle mass, extra-renal metabolism of creatinine, excessive creatine ingestion, or following therapy that affects renal tubular secretion. Calcium 03/08/2023 8.9  8.3 - 10.4 MG/DL Final    Total Bilirubin 03/08/2023 0.5  0.2 - 1.1 MG/DL Final    ALT 03/08/2023 25  12 - 65 U/L Final    AST 03/08/2023 23  15 - 37 U/L Final    Alk Phosphatase 03/08/2023 73  50 - 136 U/L Final    Total Protein 03/08/2023 6.3  6.3 - 8.2 g/dL Final    Albumin 03/08/2023 3.4  3.2 - 4.6 g/dL Final    Globulin 03/08/2023 2.9  2.8 - 4.5 g/dL Final    Albumin/Globulin Ratio 03/08/2023 1.2  0.4 - 1.6   Final         Assessent & Plan:        1.  Bilateral foot-drop  Overview:  Due to severe neuropathy. The patient will continue the current treatment with AFO splints and wheeled walker. Also uses wheelchair when she is too fatigued to use wheeled walker. 2. Age-related osteoporosis without current pathological fracture  Overview:  3/9/21 25 vitamin D 46.8.    3/5/21 DEXA scan. 4/19/2019:  LS spine films revealed mild anterior compression deformity of T12 vertebra    The patient will continue the current treatment with Fosamax, calcium, and vitamin D therapy. Orders:  -     alendronate (FOSAMAX) 35 MG tablet; Take 1 tablet by mouth every 7 days, Disp-12 tablet, R-3Normal  3. Diabetic polyneuropathy associated with type 2 diabetes mellitus (HCC)  Overview:  The patient putatively has had chronic severe diabetic neuropathy with bilateral foot drop with slowly progressive symptoms. Currently using wheeled walker / bilateral AFO splints / wheelchair PRN / gabapentin BID. Orders:  -     gabapentin (NEURONTIN) 300 MG capsule; Take 1 capsule by mouth in the morning and at bedtime for 180 days. , Disp-180 capsule, R-3Normal  4. Primary hypertension  Overview:  Well controlled on losartan 100 mg daily. The patient will continue the current treatment. Orders:  -     losartan (COZAAR) 100 MG tablet; Take 1 tablet by mouth daily, Disp-90 tablet, R-3Normal  5. Type 2 diabetes mellitus with microalbuminuria, without long-term current use of insulin (formerly [facility])  Overview:  3/8/23 , A1C 5.7  9/13/22 , A1C 5.9, urine not submitted. 9/8/21 , A1C 6.3, urine microalbumin 307  3/9/21 , A1C 6.4, urine microalbumin 353    I discussed diabetes mellitus with the patient and provided counseling. We reviewed signs and symptoms of hypo and hyperglycemia. The patient was instructed regarding management strategies. We discussed the need for good glycemic control to avoid long-term complications, and the need for yearly diabetic eye and foot examinations.   The patient will continue the current treatment. With respect to her microalbuminuria, she is on max dose losartan. Her BP is well controlled. Will monitor. Orders:  -     metFORMIN (GLUCOPHAGE) 1000 MG tablet; Take 1 tablet by mouth 2 times daily, Disp-180 tablet, R-3Normal  -     Comprehensive Metabolic Panel; Future  -     Hemoglobin A1C; Future  -     Microalbumin / Creatinine Urine Ratio; Future  6. Overactive bladder  Overview:  Evaluated by urogynecologist Dr. Alex Ross. 12/5/2018 post void residual 65 cc. Symptoms improved on Myrbetriq. The patient will continue the current treatment. Orders:  -     mirabegron (MYRBETRIQ) 50 MG TB24; Take 50 mg by mouth daily, Disp-90 tablet, R-3Normal  7. Mixed hyperlipidemia  Overview:  3/8/23 total 115 HDL 46 LDL 40  on rosuvastatin 20 mg daily. Labs were reviewed and discussed with patient. The patient will continue the current treatment. Orders:  -     rosuvastatin (CRESTOR) 20 MG tablet; Take 1 tablet by mouth daily, Disp-90 tablet, R-3Normal  8. Hypertension associated with type 2 diabetes mellitus ([address]  Overview:  Well controlled on losartan 100 mg daily. The patient will continue the current treatment. 9. Hyperlipidemia associated with type 2 diabetes mellitus ([address]  Overview:  3/8/23 total 115 HDL 46 LDL 40  on rosuvastatin 20 mg daily. Labs were reviewed and discussed with patient. The patient will continue the current treatment. 10. Restless leg syndrome  Overview:  3/9/21 ferritin 50. Symptoms improved with gabapentin. The patient will continue the current treatment. 11. Gastroesophageal reflux disease, unspecified whether esophagitis present  Overview:  The patient was educated regarding GERD, and instructed regarding therapeutic lifestyle modifications including avoiding spicy or other offending foods as well as caffeine, nicotine, and alcohol.   The patient was advised to avoid eating prior to recumbency and to eat smaller more frequent meals. Call if not better. The patient and/or patient representative voiced understanding and agreement with the current diagnoses, recommendations, and possible side effects. Return in about 6 months (around 9/15/2023) for follow up of chronic medical problems, review labs.

## 2023-04-04 DIAGNOSIS — I10 PRIMARY HYPERTENSION: ICD-10-CM

## 2023-04-04 DIAGNOSIS — E11.42 DIABETIC POLYNEUROPATHY ASSOCIATED WITH TYPE 2 DIABETES MELLITUS (HCC): ICD-10-CM

## 2023-04-04 DIAGNOSIS — E11.29 TYPE 2 DIABETES MELLITUS WITH MICROALBUMINURIA, WITHOUT LONG-TERM CURRENT USE OF INSULIN (HCC): ICD-10-CM

## 2023-04-04 DIAGNOSIS — R80.9 TYPE 2 DIABETES MELLITUS WITH MICROALBUMINURIA, WITHOUT LONG-TERM CURRENT USE OF INSULIN (HCC): ICD-10-CM

## 2023-04-04 DIAGNOSIS — N32.81 OVERACTIVE BLADDER: ICD-10-CM

## 2023-04-04 DIAGNOSIS — M81.0 AGE-RELATED OSTEOPOROSIS WITHOUT CURRENT PATHOLOGICAL FRACTURE: ICD-10-CM

## 2023-04-04 DIAGNOSIS — E78.2 MIXED HYPERLIPIDEMIA: ICD-10-CM

## 2023-04-04 RX ORDER — ROSUVASTATIN CALCIUM 20 MG/1
20 TABLET, COATED ORAL DAILY
Qty: 90 TABLET | Refills: 3 | OUTPATIENT
Start: 2023-04-04

## 2023-04-04 RX ORDER — ALENDRONATE SODIUM 35 MG/1
35 TABLET ORAL
Qty: 12 TABLET | Refills: 3 | OUTPATIENT
Start: 2023-04-04

## 2023-04-04 RX ORDER — MIRABEGRON 50 MG/1
TABLET, FILM COATED, EXTENDED RELEASE ORAL
Qty: 90 TABLET | Refills: 3 | OUTPATIENT
Start: 2023-04-04

## 2023-04-04 RX ORDER — LOSARTAN POTASSIUM 100 MG/1
100 TABLET ORAL DAILY
Qty: 90 TABLET | Refills: 3 | OUTPATIENT
Start: 2023-04-04

## 2023-04-04 RX ORDER — GABAPENTIN 300 MG/1
CAPSULE ORAL
Qty: 180 CAPSULE | Refills: 3 | OUTPATIENT
Start: 2023-04-04

## 2023-09-13 DIAGNOSIS — R80.9 TYPE 2 DIABETES MELLITUS WITH MICROALBUMINURIA, WITHOUT LONG-TERM CURRENT USE OF INSULIN (HCC): ICD-10-CM

## 2023-09-13 DIAGNOSIS — E11.29 TYPE 2 DIABETES MELLITUS WITH MICROALBUMINURIA, WITHOUT LONG-TERM CURRENT USE OF INSULIN (HCC): ICD-10-CM

## 2023-09-13 LAB
ALBUMIN SERPL-MCNC: 3.3 G/DL (ref 3.2–4.6)
ALBUMIN/GLOB SERPL: 0.9 (ref 0.4–1.6)
ALP SERPL-CCNC: 140 U/L (ref 50–136)
ALT SERPL-CCNC: 39 U/L (ref 12–65)
ANION GAP SERPL CALC-SCNC: 7 MMOL/L (ref 2–11)
AST SERPL-CCNC: 35 U/L (ref 15–37)
BILIRUB SERPL-MCNC: 0.5 MG/DL (ref 0.2–1.1)
BUN SERPL-MCNC: 15 MG/DL (ref 8–23)
CALCIUM SERPL-MCNC: 9.6 MG/DL (ref 8.3–10.4)
CHLORIDE SERPL-SCNC: 109 MMOL/L (ref 101–110)
CO2 SERPL-SCNC: 23 MMOL/L (ref 21–32)
CREAT SERPL-MCNC: 0.9 MG/DL (ref 0.6–1)
CREAT UR-MCNC: 45 MG/DL
EST. AVERAGE GLUCOSE BLD GHB EST-MCNC: 105 MG/DL
GLOBULIN SER CALC-MCNC: 3.5 G/DL (ref 2.8–4.5)
GLUCOSE SERPL-MCNC: 101 MG/DL (ref 65–100)
HBA1C MFR BLD: 5.3 % (ref 4.8–5.6)
MICROALBUMIN UR-MCNC: 23.5 MG/DL
MICROALBUMIN/CREAT UR-RTO: 522 MG/G (ref 0–30)
POTASSIUM SERPL-SCNC: 4.7 MMOL/L (ref 3.5–5.1)
PROT SERPL-MCNC: 6.8 G/DL (ref 6.3–8.2)
SODIUM SERPL-SCNC: 139 MMOL/L (ref 133–143)

## 2023-09-20 ENCOUNTER — OFFICE VISIT (OUTPATIENT)
Dept: INTERNAL MEDICINE CLINIC | Facility: CLINIC | Age: 68
End: 2023-09-20
Payer: COMMERCIAL

## 2023-09-20 DIAGNOSIS — Z23 ENCOUNTER FOR IMMUNIZATION: ICD-10-CM

## 2023-09-20 DIAGNOSIS — M81.0 AGE-RELATED OSTEOPOROSIS WITHOUT CURRENT PATHOLOGICAL FRACTURE: ICD-10-CM

## 2023-09-20 DIAGNOSIS — Z91.81 AT HIGH RISK FOR FALLS: ICD-10-CM

## 2023-09-20 DIAGNOSIS — I15.2 HYPERTENSION ASSOCIATED WITH TYPE 2 DIABETES MELLITUS (HCC): ICD-10-CM

## 2023-09-20 DIAGNOSIS — Z00.00 MEDICARE ANNUAL WELLNESS VISIT, SUBSEQUENT: Primary | Chronic | ICD-10-CM

## 2023-09-20 DIAGNOSIS — M21.372 BILATERAL FOOT-DROP: ICD-10-CM

## 2023-09-20 DIAGNOSIS — M21.371 BILATERAL FOOT-DROP: ICD-10-CM

## 2023-09-20 DIAGNOSIS — E11.42 DIABETIC POLYNEUROPATHY ASSOCIATED WITH TYPE 2 DIABETES MELLITUS (HCC): ICD-10-CM

## 2023-09-20 DIAGNOSIS — Z12.31 ENCOUNTER FOR SCREENING MAMMOGRAM FOR MALIGNANT NEOPLASM OF BREAST: Chronic | ICD-10-CM

## 2023-09-20 DIAGNOSIS — E11.69 HYPERLIPIDEMIA ASSOCIATED WITH TYPE 2 DIABETES MELLITUS (HCC): ICD-10-CM

## 2023-09-20 DIAGNOSIS — R80.9 TYPE 2 DIABETES MELLITUS WITH MICROALBUMINURIA, WITHOUT LONG-TERM CURRENT USE OF INSULIN (HCC): ICD-10-CM

## 2023-09-20 DIAGNOSIS — E11.29 TYPE 2 DIABETES MELLITUS WITH MICROALBUMINURIA, WITHOUT LONG-TERM CURRENT USE OF INSULIN (HCC): ICD-10-CM

## 2023-09-20 DIAGNOSIS — R29.6 FREQUENT FALLS: ICD-10-CM

## 2023-09-20 DIAGNOSIS — E78.5 HYPERLIPIDEMIA ASSOCIATED WITH TYPE 2 DIABETES MELLITUS (HCC): ICD-10-CM

## 2023-09-20 DIAGNOSIS — E11.59 HYPERTENSION ASSOCIATED WITH TYPE 2 DIABETES MELLITUS (HCC): ICD-10-CM

## 2023-09-20 PROBLEM — Z12.39 BREAST CANCER SCREENING: Chronic | Status: ACTIVE | Noted: 2020-12-15

## 2023-09-20 PROCEDURE — 90694 VACC AIIV4 NO PRSRV 0.5ML IM: CPT | Performed by: INTERNAL MEDICINE

## 2023-09-20 PROCEDURE — 3078F DIAST BP <80 MM HG: CPT | Performed by: INTERNAL MEDICINE

## 2023-09-20 PROCEDURE — 1123F ACP DISCUSS/DSCN MKR DOCD: CPT | Performed by: INTERNAL MEDICINE

## 2023-09-20 PROCEDURE — G0008 ADMIN INFLUENZA VIRUS VAC: HCPCS | Performed by: INTERNAL MEDICINE

## 2023-09-20 PROCEDURE — 3074F SYST BP LT 130 MM HG: CPT | Performed by: INTERNAL MEDICINE

## 2023-09-20 PROCEDURE — G0439 PPPS, SUBSEQ VISIT: HCPCS | Performed by: INTERNAL MEDICINE

## 2023-09-20 PROCEDURE — 3044F HG A1C LEVEL LT 7.0%: CPT | Performed by: INTERNAL MEDICINE

## 2023-09-20 PROCEDURE — 99214 OFFICE O/P EST MOD 30 MIN: CPT | Performed by: INTERNAL MEDICINE

## 2023-09-20 ASSESSMENT — LIFESTYLE VARIABLES
HOW MANY STANDARD DRINKS CONTAINING ALCOHOL DO YOU HAVE ON A TYPICAL DAY: PATIENT DOES NOT DRINK
HOW OFTEN DO YOU HAVE A DRINK CONTAINING ALCOHOL: NEVER

## 2023-09-20 ASSESSMENT — PATIENT HEALTH QUESTIONNAIRE - PHQ9
SUM OF ALL RESPONSES TO PHQ9 QUESTIONS 1 & 2: 0
SUM OF ALL RESPONSES TO PHQ QUESTIONS 1-9: 0
1. LITTLE INTEREST OR PLEASURE IN DOING THINGS: 0
2. FEELING DOWN, DEPRESSED OR HOPELESS: 0
SUM OF ALL RESPONSES TO PHQ QUESTIONS 1-9: 0

## 2023-09-20 NOTE — PROGRESS NOTES
patient was instructed regarding management strategies. We discussed the need for good glycemic control to avoid long-term complications, and the need for yearly diabetic eye and foot examinations. The patient will continue the current treatment. With respect to her microalbuminuria, she is on max dose losartan. Her BP is well controlled. Will monitor. Orders:  -     Hemoglobin A1C; Future  9. Encounter for screening mammogram for malignant neoplasm of breast  Overview:  6/10/21 bilateral screening mammogram normal.  Patient declines additional.  \"I think I have had enough mammograms. \"      10. At high risk for falls  11. Frequent falls  Overview:  The patient has had frequent falls at home due to her advanced peripheral polyneuropathy and despite AFO braces and wheeled walker with hand brakes and wheelchair. I recommended referral to physical therapy to see if we can improve her upper body strength and may be reduce her fall risk. The patient declines any specific intervention at this time. The patient and/or patient representative voiced understanding and agreement with the current diagnoses, recommendations, and possible side effects. Return in about 6 months (around 3/20/2024) for follow up of chronic medical problems, review labs.     On the basis of positive falls risk screening, assessment and plan is as follows: home safety tips provided          Medicare Annual Wellness Visit    Phyllis Farley is here for 6 Month Follow-Up, Fall (2 times w/o major injuries ), and Medicare AWV    Assessment & Plan   Medicare annual wellness visit, subsequent  Age-related osteoporosis without current pathological fracture  Bilateral foot-drop  Diabetic polyneuropathy associated with type 2 diabetes mellitus ([address])  Encounter for immunization  -     Influenza, FLUAD, (age 72 y+), IM, Preservative Free, 0.5 mL  Hyperlipidemia associated with type 2 diabetes mellitus ([address])  -     Lipid Panel;

## 2023-09-21 VITALS
WEIGHT: 104 LBS | SYSTOLIC BLOOD PRESSURE: 120 MMHG | BODY MASS INDEX: 19.63 KG/M2 | HEART RATE: 90 BPM | DIASTOLIC BLOOD PRESSURE: 64 MMHG | HEIGHT: 61 IN

## 2023-09-21 PROBLEM — R29.6 FREQUENT FALLS: Status: ACTIVE | Noted: 2023-09-21

## 2023-09-21 ASSESSMENT — ENCOUNTER SYMPTOMS
EYE PAIN: 0
VOICE CHANGE: 0
STRIDOR: 0
RECTAL PAIN: 0

## 2023-10-06 ENCOUNTER — TELEPHONE (OUTPATIENT)
Dept: INTERNAL MEDICINE CLINIC | Facility: CLINIC | Age: 68
End: 2023-10-06

## 2023-10-06 NOTE — TELEPHONE ENCOUNTER
----- Message from Joelle Rivas sent at 10/6/2023 10:41 AM EDT -----  Subject: Message to Provider    QUESTIONS  Information for Provider? Karn Schilder was seen on 09/20/2023 she requires 24/7   care and spouse will be hospitalized with major surgeries and will need a   written order and authorization from Dr. Chucho Salter so that she is able to have   insurance cover for a nursing facility. Pt can be reached at [phone]   ---------------------------------------------------------------------------  --------------  Bon RAJAN  [phone]; OK to leave message on voicemail  ---------------------------------------------------------------------------  --------------  SCRIPT ANSWERS  Relationship to Patient? Spouse/Partner  Representative Name? Ron Schaefer  Is the representative on the Communication Release of Information (AHSAN)   form in Epic?  Yes

## 2023-10-10 NOTE — TELEPHONE ENCOUNTER
----- Message from Andi Thomas sent at 10/10/2023 10:50 AM EDT -----  Subject: Message to Provider    QUESTIONS  Information for Provider? Called to say that Claudia Carrera / [facility] is going to send a fax request for patient for long term care, per   partner is going in for surgery and will not be able to care for her at   this time. Please contact patient when received. Thanks  ---------------------------------------------------------------------------  --------------  Figueroa RAINEY  [phone]; OK to leave message on voicemail  ---------------------------------------------------------------------------  --------------  SCRIPT ANSWERS  Relationship to Patient? Spouse/Partner  Representative Name? True Fran  Is the representative on the Communication Release of Information (AHSAN)   form in Epic?  Yes

## 2023-10-10 NOTE — TELEPHONE ENCOUNTER
Sandy Began, Admission department with [facility] called and requesting referral for this patient stating that /partner is going to have surgery soon and he will not able to care for her. He is requesting referral to [facility]. Explain to Sandy Pedroza that this patient has to be seen first. She voiced understanding. Called Patient phone number and his phone number. LM on patient phone number but unable to leave message due to VM is not set up.

## 2023-10-12 ENCOUNTER — OFFICE VISIT (OUTPATIENT)
Dept: INTERNAL MEDICINE CLINIC | Facility: CLINIC | Age: 68
End: 2023-10-12
Payer: COMMERCIAL

## 2023-10-12 VITALS
BODY MASS INDEX: 19.26 KG/M2 | WEIGHT: 102 LBS | DIASTOLIC BLOOD PRESSURE: 60 MMHG | SYSTOLIC BLOOD PRESSURE: 118 MMHG | HEART RATE: 105 BPM | HEIGHT: 61 IN

## 2023-10-12 DIAGNOSIS — N32.81 OVERACTIVE BLADDER: ICD-10-CM

## 2023-10-12 DIAGNOSIS — M21.371 BILATERAL FOOT-DROP: ICD-10-CM

## 2023-10-12 DIAGNOSIS — E11.59 HYPERTENSION ASSOCIATED WITH TYPE 2 DIABETES MELLITUS (HCC): ICD-10-CM

## 2023-10-12 DIAGNOSIS — I15.2 HYPERTENSION ASSOCIATED WITH TYPE 2 DIABETES MELLITUS (HCC): ICD-10-CM

## 2023-10-12 DIAGNOSIS — M81.0 AGE-RELATED OSTEOPOROSIS WITHOUT CURRENT PATHOLOGICAL FRACTURE: Primary | ICD-10-CM

## 2023-10-12 DIAGNOSIS — Z23 ENCOUNTER FOR IMMUNIZATION: ICD-10-CM

## 2023-10-12 DIAGNOSIS — E78.5 HYPERLIPIDEMIA ASSOCIATED WITH TYPE 2 DIABETES MELLITUS (HCC): ICD-10-CM

## 2023-10-12 DIAGNOSIS — E11.69 HYPERLIPIDEMIA ASSOCIATED WITH TYPE 2 DIABETES MELLITUS (HCC): ICD-10-CM

## 2023-10-12 DIAGNOSIS — R80.9 TYPE 2 DIABETES MELLITUS WITH MICROALBUMINURIA, WITHOUT LONG-TERM CURRENT USE OF INSULIN (HCC): ICD-10-CM

## 2023-10-12 DIAGNOSIS — E11.42 DIABETIC POLYNEUROPATHY ASSOCIATED WITH TYPE 2 DIABETES MELLITUS (HCC): ICD-10-CM

## 2023-10-12 DIAGNOSIS — G25.81 RESTLESS LEG SYNDROME: ICD-10-CM

## 2023-10-12 DIAGNOSIS — K21.9 GASTROESOPHAGEAL REFLUX DISEASE, UNSPECIFIED WHETHER ESOPHAGITIS PRESENT: ICD-10-CM

## 2023-10-12 DIAGNOSIS — M21.372 BILATERAL FOOT-DROP: ICD-10-CM

## 2023-10-12 DIAGNOSIS — K59.09 CHRONIC CONSTIPATION: ICD-10-CM

## 2023-10-12 DIAGNOSIS — E11.29 TYPE 2 DIABETES MELLITUS WITH MICROALBUMINURIA, WITHOUT LONG-TERM CURRENT USE OF INSULIN (HCC): ICD-10-CM

## 2023-10-12 PROCEDURE — 3074F SYST BP LT 130 MM HG: CPT | Performed by: INTERNAL MEDICINE

## 2023-10-12 PROCEDURE — 99214 OFFICE O/P EST MOD 30 MIN: CPT | Performed by: INTERNAL MEDICINE

## 2023-10-12 PROCEDURE — 1123F ACP DISCUSS/DSCN MKR DOCD: CPT | Performed by: INTERNAL MEDICINE

## 2023-10-12 PROCEDURE — 3078F DIAST BP <80 MM HG: CPT | Performed by: INTERNAL MEDICINE

## 2023-10-12 PROCEDURE — 3044F HG A1C LEVEL LT 7.0%: CPT | Performed by: INTERNAL MEDICINE

## 2023-10-12 ASSESSMENT — ENCOUNTER SYMPTOMS
STRIDOR: 0
RECTAL PAIN: 0
VOICE CHANGE: 0
EYE PAIN: 0

## 2023-10-12 NOTE — PROGRESS NOTES
Last Year: Not on file     Number of Places Lived in the Last Year: Not on file     Unstable Housing in the Last Year: No       Current Outpatient Medications   Medication Sig Dispense Refill    Magnesium 100 MG CAPS Take 325 mg by mouth daily      alendronate (FOSAMAX) 35 MG tablet Take 1 tablet by mouth every 7 days 12 tablet 3    gabapentin (NEURONTIN) 300 MG capsule Take 1 capsule by mouth in the morning and at bedtime for 180 days. 180 capsule 3    losartan (COZAAR) 100 MG tablet Take 1 tablet by mouth daily 90 tablet 3    metFORMIN (GLUCOPHAGE) 1000 MG tablet Take 1 tablet by mouth 2 times daily 180 tablet 3    mirabegron (MYRBETRIQ) 50 MG TB24 Take 50 mg by mouth daily 90 tablet 3    rosuvastatin (CRESTOR) 20 MG tablet Take 1 tablet by mouth daily 90 tablet 3    tiZANidine (ZANAFLEX) 2 MG tablet Take 1 tablet by mouth 3 times daily as needed (low back pain / muscle spasm) 30 tablet 0    aspirin 81 MG chewable tablet Take 1 tablet by mouth daily       No current facility-administered medications for this visit. Allergies as of 10/12/2023 - Fully Reviewed 09/20/2023   Allergen Reaction Noted    Sulfa antibiotics Other (See Comments) 12/16/2016    Sulfamethoxazole-trimethoprim Other (See Comments) 05/31/2020       Review of Systems   Constitutional:  Negative for activity change and appetite change. HENT:  Negative for drooling and voice change. Eyes:  Negative for pain. Respiratory:  Negative for stridor. Gastrointestinal:  Negative for rectal pain. Endocrine: Negative for polydipsia and polyphagia. Genitourinary:  Negative for dyspareunia and enuresis. Musculoskeletal:  Negative for gait problem and neck stiffness. Skin:  Negative for pallor. Neurological:  Negative for facial asymmetry and speech difficulty. Hematological:  Does not bruise/bleed easily. Psychiatric/Behavioral:  Negative for self-injury. The patient is not hyperactive.              Patient Care Team:  Dee Millard,